# Patient Record
Sex: MALE | Race: BLACK OR AFRICAN AMERICAN
[De-identification: names, ages, dates, MRNs, and addresses within clinical notes are randomized per-mention and may not be internally consistent; named-entity substitution may affect disease eponyms.]

---

## 2019-12-09 ENCOUNTER — HOSPITAL ENCOUNTER (INPATIENT)
Dept: HOSPITAL 61 - ER | Age: 84
LOS: 4 days | Discharge: NURSING, REHAB OR LONG-TERM CARE | DRG: 683 | End: 2019-12-13
Attending: INTERNAL MEDICINE | Admitting: INTERNAL MEDICINE
Payer: MEDICARE

## 2019-12-09 VITALS — HEIGHT: 67 IN | WEIGHT: 170 LBS | BODY MASS INDEX: 26.68 KG/M2

## 2019-12-09 DIAGNOSIS — G30.9: ICD-10-CM

## 2019-12-09 DIAGNOSIS — I50.30: ICD-10-CM

## 2019-12-09 DIAGNOSIS — K21.9: ICD-10-CM

## 2019-12-09 DIAGNOSIS — F02.80: ICD-10-CM

## 2019-12-09 DIAGNOSIS — E78.5: ICD-10-CM

## 2019-12-09 DIAGNOSIS — E78.00: ICD-10-CM

## 2019-12-09 DIAGNOSIS — Z82.49: ICD-10-CM

## 2019-12-09 DIAGNOSIS — Z87.11: ICD-10-CM

## 2019-12-09 DIAGNOSIS — E86.0: ICD-10-CM

## 2019-12-09 DIAGNOSIS — Z82.3: ICD-10-CM

## 2019-12-09 DIAGNOSIS — N40.0: ICD-10-CM

## 2019-12-09 DIAGNOSIS — N17.9: Primary | ICD-10-CM

## 2019-12-09 DIAGNOSIS — I13.0: ICD-10-CM

## 2019-12-09 DIAGNOSIS — N18.3: ICD-10-CM

## 2019-12-09 DIAGNOSIS — M19.90: ICD-10-CM

## 2019-12-09 LAB
ALBUMIN SERPL-MCNC: 3.8 G/DL (ref 3.4–5)
ALBUMIN/GLOB SERPL: 1 {RATIO} (ref 1–1.7)
ALP SERPL-CCNC: 88 U/L (ref 46–116)
ALT SERPL-CCNC: 17 U/L (ref 16–63)
ANION GAP SERPL CALC-SCNC: 8 MMOL/L (ref 6–14)
APTT PPP: YELLOW S
AST SERPL-CCNC: 20 U/L (ref 15–37)
BACTERIA #/AREA URNS HPF: 0 /HPF
BASOPHILS # BLD AUTO: 0 X10^3/UL (ref 0–0.2)
BASOPHILS NFR BLD: 1 % (ref 0–3)
BILIRUB SERPL-MCNC: 0.2 MG/DL (ref 0.2–1)
BILIRUB UR QL STRIP: NEGATIVE
BUN SERPL-MCNC: 32 MG/DL (ref 8–26)
BUN/CREAT SERPL: 15 (ref 6–20)
CALCIUM SERPL-MCNC: 8.7 MG/DL (ref 8.5–10.1)
CHLORIDE SERPL-SCNC: 106 MMOL/L (ref 98–107)
CO2 SERPL-SCNC: 29 MMOL/L (ref 21–32)
CREAT SERPL-MCNC: 2.2 MG/DL (ref 0.7–1.3)
EOSINOPHIL NFR BLD: 0.2 X10^3/UL (ref 0–0.7)
EOSINOPHIL NFR BLD: 3 % (ref 0–3)
ERYTHROCYTE [DISTWIDTH] IN BLOOD BY AUTOMATED COUNT: 14.3 % (ref 11.5–14.5)
FIBRINOGEN PPP-MCNC: CLEAR MG/DL
GFR SERPLBLD BASED ON 1.73 SQ M-ARVRAT: 34.4 ML/MIN
GLOBULIN SER-MCNC: 4 G/DL (ref 2.2–3.8)
GLUCOSE SERPL-MCNC: 111 MG/DL (ref 70–99)
HCT VFR BLD CALC: 36.7 % (ref 39–53)
HGB BLD-MCNC: 12.1 G/DL (ref 13–17.5)
LYMPHOCYTES # BLD: 2.1 X10^3/UL (ref 1–4.8)
LYMPHOCYTES NFR BLD AUTO: 32 % (ref 24–48)
MCH RBC QN AUTO: 28 PG (ref 25–35)
MCHC RBC AUTO-ENTMCNC: 33 G/DL (ref 31–37)
MCV RBC AUTO: 86 FL (ref 79–100)
MONO #: 0.9 X10^3/UL (ref 0–1.1)
MONOCYTES NFR BLD: 13 % (ref 0–9)
NEUT #: 3.5 X10^3/UL (ref 1.8–7.7)
NEUTROPHILS NFR BLD AUTO: 52 % (ref 31–73)
NITRITE UR QL STRIP: NEGATIVE
PH UR STRIP: 6.5 [PH]
PLATELET # BLD AUTO: 137 X10^3/UL (ref 140–400)
POTASSIUM SERPL-SCNC: 4.6 MMOL/L (ref 3.5–5.1)
PROT SERPL-MCNC: 7.8 G/DL (ref 6.4–8.2)
PROT UR STRIP-MCNC: NEGATIVE MG/DL
RBC # BLD AUTO: 4.25 X10^6/UL (ref 4.3–5.7)
RBC #/AREA URNS HPF: (no result) /HPF (ref 0–2)
SODIUM SERPL-SCNC: 143 MMOL/L (ref 136–145)
SQUAMOUS #/AREA URNS LPF: (no result) /LPF
UROBILINOGEN UR-MCNC: 0.2 MG/DL
WBC # BLD AUTO: 6.7 X10^3/UL (ref 4–11)
WBC #/AREA URNS HPF: (no result) /HPF (ref 0–4)

## 2019-12-09 PROCEDURE — 80048 BASIC METABOLIC PNL TOTAL CA: CPT

## 2019-12-09 PROCEDURE — 80069 RENAL FUNCTION PANEL: CPT

## 2019-12-09 PROCEDURE — 36415 COLL VENOUS BLD VENIPUNCTURE: CPT

## 2019-12-09 PROCEDURE — 93005 ELECTROCARDIOGRAM TRACING: CPT

## 2019-12-09 PROCEDURE — 71045 X-RAY EXAM CHEST 1 VIEW: CPT

## 2019-12-09 PROCEDURE — 94760 N-INVAS EAR/PLS OXIMETRY 1: CPT

## 2019-12-09 PROCEDURE — 73562 X-RAY EXAM OF KNEE 3: CPT

## 2019-12-09 PROCEDURE — 84484 ASSAY OF TROPONIN QUANT: CPT

## 2019-12-09 PROCEDURE — G0378 HOSPITAL OBSERVATION PER HR: HCPCS

## 2019-12-09 PROCEDURE — 80053 COMPREHEN METABOLIC PANEL: CPT

## 2019-12-09 PROCEDURE — 93306 TTE W/DOPPLER COMPLETE: CPT

## 2019-12-09 PROCEDURE — 80061 LIPID PANEL: CPT

## 2019-12-09 PROCEDURE — 85025 COMPLETE CBC W/AUTO DIFF WBC: CPT

## 2019-12-09 PROCEDURE — 81001 URINALYSIS AUTO W/SCOPE: CPT

## 2019-12-09 PROCEDURE — 83880 ASSAY OF NATRIURETIC PEPTIDE: CPT

## 2019-12-09 NOTE — PHYS DOC
Past Medical History


Past Medical History:  Dementia, High Cholesterol, Hypertension, Sinusitis, 

Other


Additional Past Medical Histor:  BPH, ENLARGED PROSTATE


Past Surgical History:  No Surgical History


Alcohol Use:  None


Drug Use:  None





Adult General


Chief Complaint


Chief Complaint:  MULTIPLE COMPLAINTS





[facility]





80-year-old male presents to the emergency department with complaints of 

shortness of breath, bilateral knee pain. Patient has underlying history of 

dementia, hypertension, hyperlipidemia. Patient is present with his family. Upon

further review it sounds like the shortness of breath has been worsening over 

several days worse with exertion today, family states he can't walk to the 

bathroom without having shortness of breath and having to stop. He describes 

bilateral knee pain is his primary concern he has not filled these winded. He 

denies any fall or injury. He did not use a walker. He denies cough.





Review of Systems


Review of Systems





Constitutional: Denies fever or chills []


Eyes: Denies change in visual acuity, redness, or eye pain []


HENT: Denies nasal congestion or sore throat []


Respiratory: + SOB


Cardiovascular: No additional information not addressed in HPI []


GI: Denies abdominal pain, nausea, vomiting, bloody stools or diarrhea []


Musculoskeletal: bilateral knee pain


Neurologic: Denies headache, focal weakness or sensory changes []





All other systems were reviewed and found to be within normal limits, except as 

documented in this note.





Allergies


Allergies





Allergies








Coded Allergies Type Severity Reaction Last Updated Verified


 


  No Known Drug Allergies    11/7/14 No











Physical Exam


Physical Exam





Constitutional: Well developed, well nourished, no acute distress, non-toxic 

appearance. []


HENT: Normocephalic, atraumatic, bilateral external ears normal, oropharynx 

moist, no oral exudates, nose normal. []


Eyes: PERRLA, EOMI, conjunctiva normal, no discharge. [] 


Neck: Normal range of motion, no tenderness, supple, no stridor. [] 


Cardiovascular:Heart rate regular rhythm, no murmur []


Lungs & Thorax:  decreased BS bilaterally


Abdomen: Bowel sounds normal, soft, no tenderness, no masses, no pulsatile 

masses. [] 


Skin: Warm, dry, no erythema, no rash. [] 


Back: No tenderness, no CVA tenderness. [] 


Extremities: No tenderness, no edema. [] 


Neurologic: Alert and oriented X 3, no focal deficits noted. []


Psychologic: Affect normal, judgement normal, mood normal. []





Current Patient Data


Vital Signs





                                   Vital Signs








  Date Time  Temp Pulse Resp B/P (MAP) Pulse Ox O2 Delivery O2 Flow Rate FiO2


 


12/9/19 20:02 98.0 97 14 166/84 (111) 97 Room Air  





 98.0       








Lab Values





                                Laboratory Tests








Test


 12/9/19


20:10 12/9/19


20:51 12/9/19


20:54


 


White Blood Count


 6.7 x10^3/uL


(4.0-11.0) 


 





 


Red Blood Count


 4.25 x10^6/uL


(4.30-5.70)  L 


 





 


Hemoglobin


 12.1 g/dL


(13.0-17.5)  L 


 





 


Hematocrit


 36.7 %


(39.0-53.0)  L 


 





 


Mean Corpuscular Volume


 86 fL ()


 


 





 


Mean Corpuscular Hemoglobin 28 pg (25-35)    


 


Mean Corpuscular Hemoglobin


Concent 33 g/dL


(31-37) 


 





 


Red Cell Distribution Width


 14.3 %


(11.5-14.5) 


 





 


Platelet Count


 137 x10^3/uL


(140-400)  L 


 





 


Neutrophils (%) (Auto) 52 % (31-73)    


 


Lymphocytes (%) (Auto) 32 % (24-48)    


 


Monocytes (%) (Auto) 13 % (0-9)  H  


 


Eosinophils (%) (Auto) 3 % (0-3)    


 


Basophils (%) (Auto) 1 % (0-3)    


 


Neutrophils # (Auto)


 3.5 x10^3/uL


(1.8-7.7) 


 





 


Lymphocytes # (Auto)


 2.1 x10^3/uL


(1.0-4.8) 


 





 


Monocytes # (Auto)


 0.9 x10^3/uL


(0.0-1.1) 


 





 


Eosinophils # (Auto)


 0.2 x10^3/uL


(0.0-0.7) 


 





 


Basophils # (Auto)


 0.0 x10^3/uL


(0.0-0.2) 


 





 


Urine Collection Type  Unknown   


 


Urine Color  Yellow   


 


Urine Clarity  Clear   


 


Urine pH  6.5   


 


Urine Specific Gravity  1.020   


 


Urine Protein


 


 Negative mg/dL


(NEG-TRACE) 





 


Urine Glucose (UA)


 


 Negative mg/dL


(NEG) 





 


Urine Ketones (Stick)


 


 Negative mg/dL


(NEG) 





 


Urine Blood


 


 Negative (NEG)


 





 


Urine Nitrite


 


 Negative (NEG)


 





 


Urine Bilirubin


 


 Negative (NEG)


 





 


Urine Urobilinogen Dipstick


 


 0.2 mg/dL (0.2


mg/dL) 





 


Urine Leukocyte Esterase


 


 Negative (NEG)


 





 


Urine RBC


 


 Occ /HPF (0-2)


 





 


Urine WBC


 


 Rare /HPF


(0-4) 





 


Urine Squamous Epithelial


Cells 


 Few /LPF  


 





 


Urine Bacteria


 


 0 /HPF (0-FEW)


 





 


Urine Mucus  Slight /LPF   


 


Sodium Level


 


 


 143 mmol/L


(136-145)


 


Potassium Level


 


 


 4.6 mmol/L


(3.5-5.1)


 


Chloride Level


 


 


 106 mmol/L


()


 


Carbon Dioxide Level


 


 


 29 mmol/L


(21-32)


 


Anion Gap   8 (6-14)  


 


Blood Urea Nitrogen


 


 


 32 mg/dL


(8-26)  H


 


Creatinine


 


 


 2.2 mg/dL


(0.7-1.3)  H


 


Estimated GFR


(Cockcroft-Gault) 


 


 34.4  





 


BUN/Creatinine Ratio   15 (6-20)  


 


Glucose Level


 


 


 111 mg/dL


(70-99)  H


 


Calcium Level


 


 


 8.7 mg/dL


(8.5-10.1)


 


Total Bilirubin


 


 


 0.2 mg/dL


(0.2-1.0)


 


Aspartate Amino Transferase


(AST) 


 


 20 U/L (15-37)





 


Alanine Aminotransferase (ALT)


 


 


 17 U/L (16-63)





 


Alkaline Phosphatase


 


 


 88 U/L


()


 


Troponin I Quantitative


 


 


 < 0.017 ng/mL


(0.000-0.055)


 


NT-Pro-B-Type Natriuretic


Peptide 


 


 162 pg/mL


(0-449)


 


Total Protein


 


 


 7.8 g/dL


(6.4-8.2)


 


Albumin


 


 


 3.8 g/dL


(3.4-5.0)


 


Albumin/Globulin Ratio   1.0 (1.0-1.7)  





                                Laboratory Tests


12/9/19 20:10








                                Laboratory Tests


12/9/19 20:54











EKG


EKG


[]





Radiology/Procedures


Radiology/Procedures


[]





Course & Med Decision Making


Course & Med Decision Making


Pertinent Labs and Imaging studies reviewed. (See chart for details)





[]80-year-old male presents to the emergency department with complaints of 

shortness of breath, bilateral knee pain. Patient has underlying history of 

dementia, hypertension, hyperlipidemia. Patient is present with his family. Upon

 further review it sounds like the shortness of breath has been worsening over 

several days worse with exertion today, family states he can't walk to the 

bathroom without having shortness of breath and having to stop. He describes 

bilateral knee pain is his primary concern he has not filled these winded. He 

denies any fall or injury. He did not use a walker. He denies cough.





Laboratory values reviewed, creatinine 2.2, BUN elevated 32. Urinalysis reveals 

no evidence of urinary tract infection, BNP is 162, troponin was less than 0.

017. White blood cell count within normal limits at 6.7. Chest x-ray shows no 

evidence of acute consolidation. Given a K I will admit patient with IV fluids 

overnight and plan for further evaluation with laboratory studies in the a.m.





Dragon Disclaimer


Dragon Disclaimer


This electronic medical record was generated, in whole or in part, using a voice

 recognition dictation system.





Departure


Departure


Impression:  


   Primary Impression:  


   SABRA (acute kidney injury)


   Additional Impression:  


   Dehydration


Disposition:  09 ADMITTED AS INPATIENT


Admitting Physician:  HIMS


Condition:  STABLE


Referrals:  


UNKNOWN PCP NAME (PCP)





Problem Qualifiers











MYCHAL SADLER MD               Dec 9, 2019 20:19

## 2019-12-10 VITALS — DIASTOLIC BLOOD PRESSURE: 84 MMHG | SYSTOLIC BLOOD PRESSURE: 157 MMHG

## 2019-12-10 VITALS — DIASTOLIC BLOOD PRESSURE: 91 MMHG | SYSTOLIC BLOOD PRESSURE: 144 MMHG

## 2019-12-10 VITALS — DIASTOLIC BLOOD PRESSURE: 88 MMHG | SYSTOLIC BLOOD PRESSURE: 171 MMHG

## 2019-12-10 VITALS — DIASTOLIC BLOOD PRESSURE: 76 MMHG | SYSTOLIC BLOOD PRESSURE: 142 MMHG

## 2019-12-10 VITALS — SYSTOLIC BLOOD PRESSURE: 160 MMHG | DIASTOLIC BLOOD PRESSURE: 93 MMHG

## 2019-12-10 VITALS — DIASTOLIC BLOOD PRESSURE: 89 MMHG | SYSTOLIC BLOOD PRESSURE: 151 MMHG

## 2019-12-10 LAB
ALBUMIN SERPL-MCNC: 3.4 G/DL (ref 3.4–5)
ALBUMIN/GLOB SERPL: 0.8 {RATIO} (ref 1–1.7)
ALP SERPL-CCNC: 68 U/L (ref 46–116)
ALT SERPL-CCNC: 16 U/L (ref 16–63)
ANION GAP SERPL CALC-SCNC: 9 MMOL/L (ref 6–14)
AST SERPL-CCNC: 19 U/L (ref 15–37)
BASOPHILS # BLD AUTO: 0.1 X10^3/UL (ref 0–0.2)
BASOPHILS NFR BLD: 1 % (ref 0–3)
BILIRUB SERPL-MCNC: 0.2 MG/DL (ref 0.2–1)
BUN SERPL-MCNC: 29 MG/DL (ref 8–26)
BUN/CREAT SERPL: 15 (ref 6–20)
CALCIUM SERPL-MCNC: 8.6 MG/DL (ref 8.5–10.1)
CHLORIDE SERPL-SCNC: 107 MMOL/L (ref 98–107)
CO2 SERPL-SCNC: 27 MMOL/L (ref 21–32)
CREAT SERPL-MCNC: 2 MG/DL (ref 0.7–1.3)
EOSINOPHIL NFR BLD: 0.2 X10^3/UL (ref 0–0.7)
EOSINOPHIL NFR BLD: 3 % (ref 0–3)
ERYTHROCYTE [DISTWIDTH] IN BLOOD BY AUTOMATED COUNT: 13.9 % (ref 11.5–14.5)
GFR SERPLBLD BASED ON 1.73 SQ M-ARVRAT: 38.3 ML/MIN
GLOBULIN SER-MCNC: 4.1 G/DL (ref 2.2–3.8)
GLUCOSE SERPL-MCNC: 100 MG/DL (ref 70–99)
HCT VFR BLD CALC: 36.2 % (ref 39–53)
HGB BLD-MCNC: 11.6 G/DL (ref 13–17.5)
LYMPHOCYTES # BLD: 2 X10^3/UL (ref 1–4.8)
LYMPHOCYTES NFR BLD AUTO: 31 % (ref 24–48)
MCH RBC QN AUTO: 28 PG (ref 25–35)
MCHC RBC AUTO-ENTMCNC: 32 G/DL (ref 31–37)
MCV RBC AUTO: 87 FL (ref 79–100)
MONO #: 0.9 X10^3/UL (ref 0–1.1)
MONOCYTES NFR BLD: 13 % (ref 0–9)
NEUT #: 3.3 X10^3/UL (ref 1.8–7.7)
NEUTROPHILS NFR BLD AUTO: 52 % (ref 31–73)
PLATELET # BLD AUTO: 127 X10^3/UL (ref 140–400)
POTASSIUM SERPL-SCNC: 4.2 MMOL/L (ref 3.5–5.1)
PROT SERPL-MCNC: 7.5 G/DL (ref 6.4–8.2)
RBC # BLD AUTO: 4.17 X10^6/UL (ref 4.3–5.7)
SODIUM SERPL-SCNC: 143 MMOL/L (ref 136–145)
WBC # BLD AUTO: 6.5 X10^3/UL (ref 4–11)

## 2019-12-10 RX ADMIN — DONEPEZIL HYDROCHLORIDE SCH MG: 10 TABLET, FILM COATED ORAL at 16:30

## 2019-12-10 RX ADMIN — TAMSULOSIN HYDROCHLORIDE SCH MG: 0.4 CAPSULE ORAL at 21:19

## 2019-12-10 RX ADMIN — TIMOLOL MALEATE SCH DROP: 5 SOLUTION/ DROPS OPHTHALMIC at 21:20

## 2019-12-10 RX ADMIN — MEMANTINE HYDROCHLORIDE SCH MG: 10 TABLET ORAL at 16:30

## 2019-12-10 RX ADMIN — LISINOPRIL SCH MG: 10 TABLET ORAL at 16:30

## 2019-12-10 RX ADMIN — SIMVASTATIN SCH MG: 40 TABLET, FILM COATED ORAL at 21:19

## 2019-12-10 NOTE — PDOC1
History and Physical


Date of Admission


Date of Admission


DATE: 12/10/19 


TIME: 09:40





Identification/Chief Complaint


Chief Complaint


 SEEN IN ER , 80-year-old male presents to the emergency department with 

complaints of shortness of breath, bilateral knee pain. Patient has underlying 

history of dementia, hypertension, hyperlipidemia. Patient is present with his 

family. Upon further review it sounds like the shortness of breath has been 

worsening over several days worse with exertion 








family states he can't walk to the bathroom without having shortness of breath 

and having to stop. 





He describes bilateral knee pain is his primary concern, unable to walk , PT/OT 

ORDERED





Past Medical History


Past Medical History:  Dementia, High Cholesterol, Hypertension, Sinusitis, 

Other


Additional Past Medical Histor:  BPH, ENLARGED PROSTATE


Past Surgical History:  No Surgical History


Alcohol Use:  None


Drug Use:  None








fhx htn


Cardiovascular:  HTN, Hyperlipidemia


Pulmonary:  No pertinent hx


CENTRAL NERVOUS SYSTEM:  Dementia


GI:  GERD, Peptic Ulcer disease


Heme/Onc:  No pertinent hx


Hepatobiliary:  No pertinent hx


Psych:  No pertinent hx


Musculoskeletal:  Osteoarthritis


Rheumatologic:  No pertinent hx


Infectious disease:  No pertinent hx


Renal/:  No pertinent hx


Endocrine:  No pertinent hx





Past Surgical History


Past Surgical History:  Cataract Removal





Family History


Family History:  Hypertension, Stroke





Social History


Smoke:  No


ALCOHOL:  none


Drugs:  None





Current Problem List


Problem List


Problems


Medical Problems:


(1) SABRA (acute kidney injury)


Status: Acute  





(2) Dehydration


Status: Acute  











Current Medications


Current Medications





Current Medications


Ondansetron HCl (Zofran) 4 mg PRN Q8HRS  PRN IV NAUSEA/VOMITING 1ST CHOICE;  

Start 12/9/19 at 22:30;  Stop 12/10/19 at 22:29


Acetaminophen (Tylenol) 650 mg PRN Q4HRS  PRN PO FEVER;  Start 12/9/19 at 22:30;

 Stop 12/10/19 at 22:29


Sodium Chloride 1,000 ml @  75 mls/hr 1X  ONCE IV  Last administered on 

12/9/19at 23:36;  Start 12/9/19 at 23:00;  Stop 12/10/19 at 12:19





Active Scripts


Active


Reported


Tamsulosin Hcl 0.4 Mg Cap.er.24h    


Namenda (Memantine Hcl) 10 Mg Tablet    


Timolol Maleate 10 Ml Drops    


Lisinopril 20 Mg Tablet    


Donepezil Hcl 10 Mg Tablet    


Simvastatin 40 Mg Tablet





Allergies


Allergies:  


Coded Allergies:  


     No Known Drug Allergies (Unverified , 11/7/14)





ROS


Review of System





Review of Systems


Review of Systems





Constitutional: Denies fever or chills []


Eyes: Denies change in visual acuity, redness, or eye pain []


HENT: Denies nasal congestion or sore throat []


Respiratory: + SOB


Cardiovascular: No additional information not addressed in HPI []


GI: Denies abdominal pain, nausea, vomiting, bloody stools or diarrhea []


Musculoskeletal: bilateral knee pain


Neurologic: Denies headache, focal weakness or sensory changes []





14 PT  systems were reviewed and found to be within normal limits, except as doc

umented .


General:  YES: Fatigue


PSYCHOLOGICAL ROS:  YES: Anxiety, Memory difficulties


Hematological and Lymphatic:  No: Bleeding Problems, Blood Clots, Blood 

Transfusions, Brusing, Night Sweats, Pallor, Swollen Lymph Nodes, Other


Cardiovascular:  No Chest Pain, No Palpitations, No Orthopnea, No Paroxysmal 

Noc. Dyspnea, No Edema, No Lt Headedness, No Other


Gastrointestinal:  No Nausea, No Vomiting, No Abdominal Pain, No Diarrhea, No 

Constipation, No Melena, No Hematochezia, No Other


Musculoskeletal:  Yes Gait Disturbance, Yes Joint Pain, Yes Joint Stiffness


Neurological:  Yes Gait Disturbance





Physical Exam


Physical Exam





Physical Exam


Physical Exam





Constitutional: Well developed, well nourished, no acute distress, non-toxic 

appearance. []


HENT: Normocephalic, atraumatic, bilateral external ears normal, oropharynx 

moist, no oral exudates, nose normal. []


Eyes: PERRLA, EOMI, conjunctiva normal, no discharge. [] 


Neck: Normal range of motion, no tenderness, supple, no stridor. [] 


Cardiovascular:Heart rate regular rhythm, no murmur []


Lungs & Thorax:  decreased BS bilaterally


Abdomen: Bowel sounds normal, soft, no tenderness, no masses, no pulsatile 

masses. [] 


Skin: Warm, dry, no erythema, no rash. [] 


Back: No tenderness, no CVA tenderness. [] 


Extremities: No tenderness, no edema. [] 


Neurologic: Alert and oriented X 3, no focal deficits noted. []


Psychologic: Affect normal, judgment normal, mood normal. []


General:  Alert, Cooperative, No acute distress


Breasts:  Not examined


Abdomen:  Normal bowel sounds, Soft


Rectal Exam:  not examined


PELVIC:  Examination not indicated


Extremities:  No cyanosis


Neuro:  Normal speech, Cranial nerves 3-12 NL


Psych/Mental Status:  Mood NL





Vitals


Vitals





Vital Signs








  Date Time  Temp Pulse Resp B/P (MAP) Pulse Ox O2 Delivery O2 Flow Rate FiO2


 


12/10/19 07:00 97.7 80 18 171/88 (115) 98 Room Air  





 97.7       











Labs


Labs





Laboratory Tests








Test


 12/9/19


20:10 12/9/19


20:51 12/9/19


20:54 12/10/19


04:23


 


White Blood Count


 6.7 x10^3/uL


(4.0-11.0) 


 


 6.5 x10^3/uL


(4.0-11.0)


 


Red Blood Count


 4.25 x10^6/uL


(4.30-5.70) 


 


 4.17 x10^6/uL


(4.30-5.70)


 


Hemoglobin


 12.1 g/dL


(13.0-17.5) 


 


 11.6 g/dL


(13.0-17.5)


 


Hematocrit


 36.7 %


(39.0-53.0) 


 


 36.2 %


(39.0-53.0)


 


Mean Corpuscular Volume 86 fL ()    87 fL () 


 


Mean Corpuscular Hemoglobin 28 pg (25-35)    28 pg (25-35) 


 


Mean Corpuscular Hemoglobin


Concent 33 g/dL


(31-37) 


 


 32 g/dL


(31-37)


 


Red Cell Distribution Width


 14.3 %


(11.5-14.5) 


 


 13.9 %


(11.5-14.5)


 


Platelet Count


 137 x10^3/uL


(140-400) 


 


 127 x10^3/uL


(140-400)


 


Neutrophils (%) (Auto) 52 % (31-73)    52 % (31-73) 


 


Lymphocytes (%) (Auto) 32 % (24-48)    31 % (24-48) 


 


Monocytes (%) (Auto) 13 % (0-9)    13 % (0-9) 


 


Eosinophils (%) (Auto) 3 % (0-3)    3 % (0-3) 


 


Basophils (%) (Auto) 1 % (0-3)    1 % (0-3) 


 


Neutrophils # (Auto)


 3.5 x10^3/uL


(1.8-7.7) 


 


 3.3 x10^3/uL


(1.8-7.7)


 


Lymphocytes # (Auto)


 2.1 x10^3/uL


(1.0-4.8) 


 


 2.0 x10^3/uL


(1.0-4.8)


 


Monocytes # (Auto)


 0.9 x10^3/uL


(0.0-1.1) 


 


 0.9 x10^3/uL


(0.0-1.1)


 


Eosinophils # (Auto)


 0.2 x10^3/uL


(0.0-0.7) 


 


 0.2 x10^3/uL


(0.0-0.7)


 


Basophils # (Auto)


 0.0 x10^3/uL


(0.0-0.2) 


 


 0.1 x10^3/uL


(0.0-0.2)


 


Urine Collection Type  Unknown   


 


Urine Color  Yellow   


 


Urine Clarity  Clear   


 


Urine pH  6.5   


 


Urine Specific Gravity  1.020   


 


Urine Protein


 


 Negative mg/dL


(NEG-TRACE) 


 





 


Urine Glucose (UA)


 


 Negative mg/dL


(NEG) 


 





 


Urine Ketones (Stick)


 


 Negative mg/dL


(NEG) 


 





 


Urine Blood  Negative (NEG)   


 


Urine Nitrite  Negative (NEG)   


 


Urine Bilirubin  Negative (NEG)   


 


Urine Urobilinogen Dipstick


 


 0.2 mg/dL (0.2


mg/dL) 


 





 


Urine Leukocyte Esterase  Negative (NEG)   


 


Urine RBC  Occ /HPF (0-2)   


 


Urine WBC


 


 Rare /HPF


(0-4) 


 





 


Urine Squamous Epithelial


Cells 


 Few /LPF 


 


 





 


Urine Bacteria  0 /HPF (0-FEW)   


 


Urine Mucus  Slight /LPF   


 


Sodium Level


 


 


 143 mmol/L


(136-145) 143 mmol/L


(136-145)


 


Potassium Level


 


 


 4.6 mmol/L


(3.5-5.1) 4.2 mmol/L


(3.5-5.1)


 


Chloride Level


 


 


 106 mmol/L


() 107 mmol/L


()


 


Carbon Dioxide Level


 


 


 29 mmol/L


(21-32) 27 mmol/L


(21-32)


 


Anion Gap   8 (6-14)  9 (6-14) 


 


Blood Urea Nitrogen


 


 


 32 mg/dL


(8-26) 29 mg/dL


(8-26)


 


Creatinine


 


 


 2.2 mg/dL


(0.7-1.3) 2.0 mg/dL


(0.7-1.3)


 


Estimated GFR


(Cockcroft-Gault) 


 


 34.4 


 38.3 





 


BUN/Creatinine Ratio   15 (6-20)  15 (6-20) 


 


Glucose Level


 


 


 111 mg/dL


(70-99) 100 mg/dL


(70-99)


 


Calcium Level


 


 


 8.7 mg/dL


(8.5-10.1) 8.6 mg/dL


(8.5-10.1)


 


Total Bilirubin


 


 


 0.2 mg/dL


(0.2-1.0) 0.2 mg/dL


(0.2-1.0)


 


Aspartate Amino Transf


(AST/SGOT) 


 


 20 U/L (15-37) 


 19 U/L (15-37) 





 


Alanine Aminotransferase


(ALT/SGPT) 


 


 17 U/L (16-63) 


 16 U/L (16-63) 





 


Alkaline Phosphatase


 


 


 88 U/L


() 68 U/L


()


 


Troponin I Quantitative


 


 


 < 0.017 ng/mL


(0.000-0.055) 





 


NT-Pro-B-Type Natriuretic


Peptide 


 


 162 pg/mL


(0-449) 





 


Total Protein


 


 


 7.8 g/dL


(6.4-8.2) 7.5 g/dL


(6.4-8.2)


 


Albumin


 


 


 3.8 g/dL


(3.4-5.0) 3.4 g/dL


(3.4-5.0)


 


Albumin/Globulin Ratio   1.0 (1.0-1.7)  0.8 (1.0-1.7) 








Laboratory Tests








Test


 12/9/19


20:10 12/9/19


20:51 12/9/19


20:54 12/10/19


04:23


 


White Blood Count


 6.7 x10^3/uL


(4.0-11.0) 


 


 6.5 x10^3/uL


(4.0-11.0)


 


Red Blood Count


 4.25 x10^6/uL


(4.30-5.70) 


 


 4.17 x10^6/uL


(4.30-5.70)


 


Hemoglobin


 12.1 g/dL


(13.0-17.5) 


 


 11.6 g/dL


(13.0-17.5)


 


Hematocrit


 36.7 %


(39.0-53.0) 


 


 36.2 %


(39.0-53.0)


 


Mean Corpuscular Volume 86 fL ()    87 fL () 


 


Mean Corpuscular Hemoglobin 28 pg (25-35)    28 pg (25-35) 


 


Mean Corpuscular Hemoglobin


Concent 33 g/dL


(31-37) 


 


 32 g/dL


(31-37)


 


Red Cell Distribution Width


 14.3 %


(11.5-14.5) 


 


 13.9 %


(11.5-14.5)


 


Platelet Count


 137 x10^3/uL


(140-400) 


 


 127 x10^3/uL


(140-400)


 


Neutrophils (%) (Auto) 52 % (31-73)    52 % (31-73) 


 


Lymphocytes (%) (Auto) 32 % (24-48)    31 % (24-48) 


 


Monocytes (%) (Auto) 13 % (0-9)    13 % (0-9) 


 


Eosinophils (%) (Auto) 3 % (0-3)    3 % (0-3) 


 


Basophils (%) (Auto) 1 % (0-3)    1 % (0-3) 


 


Neutrophils # (Auto)


 3.5 x10^3/uL


(1.8-7.7) 


 


 3.3 x10^3/uL


(1.8-7.7)


 


Lymphocytes # (Auto)


 2.1 x10^3/uL


(1.0-4.8) 


 


 2.0 x10^3/uL


(1.0-4.8)


 


Monocytes # (Auto)


 0.9 x10^3/uL


(0.0-1.1) 


 


 0.9 x10^3/uL


(0.0-1.1)


 


Eosinophils # (Auto)


 0.2 x10^3/uL


(0.0-0.7) 


 


 0.2 x10^3/uL


(0.0-0.7)


 


Basophils # (Auto)


 0.0 x10^3/uL


(0.0-0.2) 


 


 0.1 x10^3/uL


(0.0-0.2)


 


Urine Collection Type  Unknown   


 


Urine Color  Yellow   


 


Urine Clarity  Clear   


 


Urine pH  6.5   


 


Urine Specific Gravity  1.020   


 


Urine Protein


 


 Negative mg/dL


(NEG-TRACE) 


 





 


Urine Glucose (UA)


 


 Negative mg/dL


(NEG) 


 





 


Urine Ketones (Stick)


 


 Negative mg/dL


(NEG) 


 





 


Urine Blood  Negative (NEG)   


 


Urine Nitrite  Negative (NEG)   


 


Urine Bilirubin  Negative (NEG)   


 


Urine Urobilinogen Dipstick


 


 0.2 mg/dL (0.2


mg/dL) 


 





 


Urine Leukocyte Esterase  Negative (NEG)   


 


Urine RBC  Occ /HPF (0-2)   


 


Urine WBC


 


 Rare /HPF


(0-4) 


 





 


Urine Squamous Epithelial


Cells 


 Few /LPF 


 


 





 


Urine Bacteria  0 /HPF (0-FEW)   


 


Urine Mucus  Slight /LPF   


 


Sodium Level


 


 


 143 mmol/L


(136-145) 143 mmol/L


(136-145)


 


Potassium Level


 


 


 4.6 mmol/L


(3.5-5.1) 4.2 mmol/L


(3.5-5.1)


 


Chloride Level


 


 


 106 mmol/L


() 107 mmol/L


()


 


Carbon Dioxide Level


 


 


 29 mmol/L


(21-32) 27 mmol/L


(21-32)


 


Anion Gap   8 (6-14)  9 (6-14) 


 


Blood Urea Nitrogen


 


 


 32 mg/dL


(8-26) 29 mg/dL


(8-26)


 


Creatinine


 


 


 2.2 mg/dL


(0.7-1.3) 2.0 mg/dL


(0.7-1.3)


 


Estimated GFR


(Cockcroft-Gault) 


 


 34.4 


 38.3 





 


BUN/Creatinine Ratio   15 (6-20)  15 (6-20) 


 


Glucose Level


 


 


 111 mg/dL


(70-99) 100 mg/dL


(70-99)


 


Calcium Level


 


 


 8.7 mg/dL


(8.5-10.1) 8.6 mg/dL


(8.5-10.1)


 


Total Bilirubin


 


 


 0.2 mg/dL


(0.2-1.0) 0.2 mg/dL


(0.2-1.0)


 


Aspartate Amino Transf


(AST/SGOT) 


 


 20 U/L (15-37) 


 19 U/L (15-37) 





 


Alanine Aminotransferase


(ALT/SGPT) 


 


 17 U/L (16-63) 


 16 U/L (16-63) 





 


Alkaline Phosphatase


 


 


 88 U/L


() 68 U/L


()


 


Troponin I Quantitative


 


 


 < 0.017 ng/mL


(0.000-0.055) 





 


NT-Pro-B-Type Natriuretic


Peptide 


 


 162 pg/mL


(0-449) 





 


Total Protein


 


 


 7.8 g/dL


(6.4-8.2) 7.5 g/dL


(6.4-8.2)


 


Albumin


 


 


 3.8 g/dL


(3.4-5.0) 3.4 g/dL


(3.4-5.0)


 


Albumin/Globulin Ratio   1.0 (1.0-1.7)  0.8 (1.0-1.7) 











Images


Images





KNEE BILAT 3V


 


History: Knee pain, unknown if injury


 


Comparison: None.


 


Findings:


3 views of the bilateral knees vertebral 6 views are submitted. No acute 


fracture is identified. There is severe narrowing of the medial 


compartment joint space of the left knee, to lesser degree of the 


patellofemoral articulation. There is mild osteophyte formation of the 


lateral compartment of the left knee. There is vascular calcification. 


There is severe narrowing of the medial compartment joint space of the 


right knee, to lesser degree of the patellofemoral articulation, minimal 


osteoarthritic change of the left compartment of the right knee.


 


Impression: 


 


1.  There is tricompartmental osteoarthritic change of the bilateral 


knees, severe narrowing of the medial compartment joint spaces.


 


Electronically signed by: Edinson Michel MD (12/10/2019 12:21 AM) 


[facility]














DICTATED and SIGNED BY:     EDINSON MICHEL MD


DATE:     12/10/19 0021





Aortic Valve


AoV Peak Ben.   112.6cm/s   AoV VTI      24.6cm


AO Peak GR.   5.1mmHg      LVOT Peak Ben.   93.1cm/s


LVOT  VTI    20.51cm      AO Mean GR.   3mmHg


PEYTON (VMAX)   3.22cm2      PEYTON   (VTI)   3.25cm2


AI P 1/2 Time   459ms            





Mitral Valve


MV E Velocity   54.7cm/s   MV E Peak Gr.   3mmHg


MV DECEL TIME   196ms      MV A Velocity   85.5cm/s


MV E Mean Gr.   1mmHg      MV PHT      58ms


E/A  Ratio   0.6      MV A Duration   131ms


MVA (PHT)   3.81cm2            





Tricuspid Valve


TR P. Velocity   233cm/s   RAP ESTIMATE   5mmHg


TR Peak Gr.   22mmHg   RVSP      27mmHg





 LEFT VENTRICLE 


The left ventricle is normal size. There is borderline concentric left 

ventricular hypertrophy. Low normal LV systolic function. EF 50-55% Transmitral 

Doppler flow pattern is Grade I-abnormal relaxation pattern.





 RIGHT VENTRICLE 


The right ventricle is normal size. There is normal right ventricular wall 

thickness. The right ventricular systolic function is normal.





 ATRIA 


The left atrium size is normal. The right atrium size is normal. The interatrial

 septum is intact with no evidence for an atrial septal defect or patent foramen

 ovale as noted on 2-D or Doppler imaging.





 AORTIC VALVE 


The aortic valve is calcified but opens well. Doppler and Color Flow revealed 

trace aortic regurgitation. There is no significant aortic valvular stenosis.





 MITRAL VALVE 


The mitral valve is normal in structure. There is no evidence of mitral valve 

prolapse. There is no mitral valve stenosis. Doppler and Color Flow revealed 

trace to mild mitral regurgitation.





 TRICUSPID VALVE 


The tricuspid valve is normal in structure. Doppler and Color Flow revealed mild

 tricuspid regurgitation. The PA pressure was estimated at 27 mmHg. There is no 

tricuspid valve stenosis.





 PULMONIC VALVE 


The pulmonic valve is not well visualized. Doppler and Color Flow revealed 

moderate to severe pulmonic valvular regurgitation. There is no pulmonic 

valvular stenosis.





 GREAT VESSELS 


The aortic root is normal in size. Normal pulmonary venous flow (Doppler). The 

IVC is normal in size and collapses >50% with inspiration.





 PERICARDIAL EFFUSION 


There is no evidence of significant pericardial effusion.





Critical Notification


Critical Value: No





<Conclusion>


Low normal LV systolic function. EF 50-55%


No significant valvular abnormalities














DICTATED and SIGNED BY:     IRENE LAW MD


DATE:     07/24/15 1107





CC: ASHVIN MOSES; IRENE LAW MD; NO PCP; UNKNOWN PCP NAME ~





PORTABLE CHEST 1V


 


History: Shortness of breath


 


Comparison: July 23, 2015.


 


Findings:


Single view of the chest is submitted. 


There is no infiltrate, pneumothorax, or effusion. The pericardial cardiac


silhouette is within normal limits in size. There is somewhat tortuous 


thoracic aorta, atherosclerotic calcification near arch. 


 


Impression: 


 


1.  There is no significant infiltrate.


 


Electronically signed by: Edinson Michel MD (12/10/2019 12:11 AM) 


[facility]














DICTATED and SIGNED BY:     EDINSON MICHEL MD


DATE:     12/10/19 0011





VTE Prophylaxis Ordered


VTE Prophylaxis Devices:  No


VTE Pharmacological Prophylaxi:  No





Assessment/Plan


Assessment/Plan


 Impression:  





   SABRA (acute kidney injury)


   KNEE PAIN


   GAIT INSTABILITY


   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 

   narrowing of the medial compartment joint spaces.


   Dehydration











ADMITTED





NEPHROLOGY CONSULT


ORTHO CONSULT


BEDREST


FALL PRECAUTIONS


IV FLUID SUPPORT


NO NSAIDS


FOLLOW RENAL FX CLOSELY


pt/ot











RAMON WRIGHT MD          Dec 10, 2019 09:40

## 2019-12-10 NOTE — RAD
KNEE BILAT 3V

 

History: Knee pain, unknown if injury

 

Comparison: None.

 

Findings:

3 views of the bilateral knees vertebral 6 views are submitted. No acute 

fracture is identified. There is severe narrowing of the medial 

compartment joint space of the left knee, to lesser degree of the 

patellofemoral articulation. There is mild osteophyte formation of the 

lateral compartment of the left knee. There is vascular calcification. 

There is severe narrowing of the medial compartment joint space of the 

right knee, to lesser degree of the patellofemoral articulation, minimal 

osteoarthritic change of the left compartment of the right knee.

 

Impression: 

 

1.  There is tricompartmental osteoarthritic change of the bilateral 

knees, severe narrowing of the medial compartment joint spaces.

 

Electronically signed by: James Mock MD (12/10/2019 12:21 AM) 

[facility]

## 2019-12-10 NOTE — PDOC2
CONSULT


Date of Consult


Date of Consult


DATE: 12/10/19 


TIME: 18:31





Reason for Consult


Reason for Consult:


Bilateral knee pain





Identification/Chief Complaint


Chief Complaint


Difficult walking, bilateral knee pain





Source


Source:  Chart review, Patient





History of Present Illness


Reason for Visit:


This 88-year-old man lives with his family members including his son, 

daughter-in-law and others. He no longer drives. He's had difficulty walking. He

uses a walker routinely. His son gave some of the history and was in the room.





Past Medical History


Cardiovascular:  HTN, Hyperlipidemia


Pulmonary:  No pertinent hx


CENTRAL NERVOUS SYSTEM:  Dementia


GI:  GERD, Peptic Ulcer disease


Heme/Onc:  No pertinent hx


Hepatobiliary:  No pertinent hx


Psych:  No pertinent hx


Musculoskeletal:  Osteoarthritis


Rheumatologic:  No pertinent hx


Infectious disease:  No pertinent hx


Renal/:  No pertinent hx


Endocrine:  No pertinent hx





Past Surgical History


Past Surgical History:  Cataract Removal





Family History


Family History:  Hypertension, Stroke





Social History


No


ALCOHOL:  none


Drugs:  None


Lives:  with Family


Domestic Violence:  Neg





Current Problem List


Problem List


Problems


Medical Problems:


(1) SABRA (acute kidney injury)


Status: Acute  





(2) Dehydration


Status: Acute  











Current Medications


Current Medications





Current Medications


Ondansetron HCl (Zofran) 4 mg PRN Q8HRS  PRN IV NAUSEA/VOMITING 1ST CHOICE;  

Start 19 at 22:30;  Stop 12/10/19 at 22:29


Acetaminophen (Tylenol) 650 mg PRN Q4HRS  PRN PO FEVER;  Start 19 at 22:30;

 Stop 12/10/19 at 22:29


Sodium Chloride 1,000 ml @  75 mls/hr 1X  ONCE IV  Last administered on 

19at 23:36;  Start 19 at 23:00;  Stop 12/10/19 at 12:19;  Status DC


Donepezil HCl (Aricept) 10 mg DAILY08 PO ;  Start 12/10/19 at 16:30


Lisinopril (Prinivil) 10 mg DAILY08 PO ;  Start 12/10/19 at 16:30


Memantine (Namenda) 10 mg DAILY08 PO ;  Start 12/10/19 at 16:30


Simvastatin (Zocor) 40 mg HS PO ;  Start 12/10/19 at 21:00


Tamsulosin HCl (Flomax) 0.4 mg HS PO ;  Start 12/10/19 at 21:00


Timolol Maleate (Timoptic 0.5% Ophth) 1 drop HS OU ;  Start 12/10/19 at 21:00





Active Scripts


Active


Reported


Tamsulosin Hcl 0.4 Mg Cap.er.24h   


Namenda (Memantine Hcl) 10 Mg Tablet   


Timolol Maleate 10 Ml Drops   


Lisinopril 20 Mg Tablet   


Donepezil Hcl 10 Mg Tablet   


Simvastatin 40 Mg Tablet





Allergies


Allergies:  


Coded Allergies:  


     No Known Drug Allergies (Unverified , 14)





ROS


Review of System


14 PT  systems were reviewed and found to be within normal limits, except as 

documented .


General:  YES: Fatigue


PSYCHOLOGICAL ROS:  YES: Anxiety, Memory difficulties


Hematological and Lymphatic:  No: Bleeding Problems, Blood Clots, Blood 

Transfusions, Brusing, Night Sweats, Pallor, Swollen Lymph Nodes, Other


Cardiovascular:  No Chest Pain, No Palpitations, No Orthopnea, No Paroxysmal 

Noc. Dyspnea, No Edema, No Lt Headedness, No Other


Gastrointestinal:  No Nausea, No Vomiting, No Abdominal Pain, No Diarrhea, No 

Constipation, No Melena, No Hematochezia, No Other


Musculoskeletal:  Yes Gait Disturbance, Yes Joint Pain, Yes Joint Stiffness


Neurological:  Yes Gait Disturbance





Physical Exam


General:  Alert, Cooperative, No acute distress


HEENT:  Atraumatic


Lungs:  Normal air movement


Heart:  Regular rate


Abdomen:  Soft


Extremities:  Normal pulses, Other (He uses a walker and has a slow cautious 

gait. The RIGHT knee shows a severe varus alignment.  No masses.  No detectable 

effusion.  Tenderness on the joint lines.  Range of motion is  degrees.  

There is crepitus with range of motion, and pain at the extremes of motion.  The

knee is stable to varus and valgus stress without subluxation or laxity.  Muscle

strength is slightly weak for the quadriceps 4+/5 which may be due to pain or 

avoidance, and does not seem neurogenic, and the muscle tone and bulk is sli

ghtly decreased. The hamstring strength is 5/5.   The skin is normal with no 

scars, rashes, lesions or ulcers. Light touch sensation is intact. No edema and 

no varicosities.  Dorsalis pedis pulse is intact and capillary refill is 

normal.)


Skin:  No breakdown, No significant lesion


Neuro:  Normal speech, Sensation intact





Vitals


VITALS





Vital Signs








  Date Time  Temp Pulse Resp B/P (MAP) Pulse Ox O2 Delivery O2 Flow Rate FiO2


 


12/10/19 15:00 97.6 76 18 157/84 (108) 98 Room Air  





 97.6       











Labs


Labs





Laboratory Tests








Test


 19


20:10 19


20:51 19


20:54 12/10/19


04:23


 


White Blood Count


 6.7 x10^3/uL


(4.0-11.0) 


 


 6.5 x10^3/uL


(4.0-11.0)


 


Red Blood Count


 4.25 x10^6/uL


(4.30-5.70) 


 


 4.17 x10^6/uL


(4.30-5.70)


 


Hemoglobin


 12.1 g/dL


(13.0-17.5) 


 


 11.6 g/dL


(13.0-17.5)


 


Hematocrit


 36.7 %


(39.0-53.0) 


 


 36.2 %


(39.0-53.0)


 


Mean Corpuscular Volume 86 fL ()    87 fL () 


 


Mean Corpuscular Hemoglobin 28 pg (25-35)    28 pg (25-35) 


 


Mean Corpuscular Hemoglobin


Concent 33 g/dL


(31-37) 


 


 32 g/dL


(31-37)


 


Red Cell Distribution Width


 14.3 %


(11.5-14.5) 


 


 13.9 %


(11.5-14.5)


 


Platelet Count


 137 x10^3/uL


(140-400) 


 


 127 x10^3/uL


(140-400)


 


Neutrophils (%) (Auto) 52 % (31-73)    52 % (31-73) 


 


Lymphocytes (%) (Auto) 32 % (24-48)    31 % (24-48) 


 


Monocytes (%) (Auto) 13 % (0-9)    13 % (0-9) 


 


Eosinophils (%) (Auto) 3 % (0-3)    3 % (0-3) 


 


Basophils (%) (Auto) 1 % (0-3)    1 % (0-3) 


 


Neutrophils # (Auto)


 3.5 x10^3/uL


(1.8-7.7) 


 


 3.3 x10^3/uL


(1.8-7.7)


 


Lymphocytes # (Auto)


 2.1 x10^3/uL


(1.0-4.8) 


 


 2.0 x10^3/uL


(1.0-4.8)


 


Monocytes # (Auto)


 0.9 x10^3/uL


(0.0-1.1) 


 


 0.9 x10^3/uL


(0.0-1.1)


 


Eosinophils # (Auto)


 0.2 x10^3/uL


(0.0-0.7) 


 


 0.2 x10^3/uL


(0.0-0.7)


 


Basophils # (Auto)


 0.0 x10^3/uL


(0.0-0.2) 


 


 0.1 x10^3/uL


(0.0-0.2)


 


Urine Collection Type  Unknown   


 


Urine Color  Yellow   


 


Urine Clarity  Clear   


 


Urine pH  6.5   


 


Urine Specific Gravity  1.020   


 


Urine Protein


 


 Negative mg/dL


(NEG-TRACE) 


 





 


Urine Glucose (UA)


 


 Negative mg/dL


(NEG) 


 





 


Urine Ketones (Stick)


 


 Negative mg/dL


(NEG) 


 





 


Urine Blood  Negative (NEG)   


 


Urine Nitrite  Negative (NEG)   


 


Urine Bilirubin  Negative (NEG)   


 


Urine Urobilinogen Dipstick


 


 0.2 mg/dL (0.2


mg/dL) 


 





 


Urine Leukocyte Esterase  Negative (NEG)   


 


Urine RBC  Occ /HPF (0-2)   


 


Urine WBC


 


 Rare /HPF


(0-4) 


 





 


Urine Squamous Epithelial


Cells 


 Few /LPF 


 


 





 


Urine Bacteria  0 /HPF (0-FEW)   


 


Urine Mucus  Slight /LPF   


 


Sodium Level


 


 


 143 mmol/L


(136-145) 143 mmol/L


(136-145)


 


Potassium Level


 


 


 4.6 mmol/L


(3.5-5.1) 4.2 mmol/L


(3.5-5.1)


 


Chloride Level


 


 


 106 mmol/L


() 107 mmol/L


()


 


Carbon Dioxide Level


 


 


 29 mmol/L


(21-32) 27 mmol/L


(21-32)


 


Anion Gap   8 (6-14)  9 (6-14) 


 


Blood Urea Nitrogen


 


 


 32 mg/dL


(8-26) 29 mg/dL


(8-26)


 


Creatinine


 


 


 2.2 mg/dL


(0.7-1.3) 2.0 mg/dL


(0.7-1.3)


 


Estimated GFR


(Cockcroft-Gault) 


 


 34.4 


 38.3 





 


BUN/Creatinine Ratio   15 (6-20)  15 (6-20) 


 


Glucose Level


 


 


 111 mg/dL


(70-99) 100 mg/dL


(70-99)


 


Calcium Level


 


 


 8.7 mg/dL


(8.5-10.1) 8.6 mg/dL


(8.5-10.1)


 


Total Bilirubin


 


 


 0.2 mg/dL


(0.2-1.0) 0.2 mg/dL


(0.2-1.0)


 


Aspartate Amino Transf


(AST/SGOT) 


 


 20 U/L (15-37) 


 19 U/L (15-37) 





 


Alanine Aminotransferase


(ALT/SGPT) 


 


 17 U/L (16-63) 


 16 U/L (16-63) 





 


Alkaline Phosphatase


 


 


 88 U/L


() 68 U/L


()


 


Troponin I Quantitative


 


 


 < 0.017 ng/mL


(0.000-0.055) 





 


NT-Pro-B-Type Natriuretic


Peptide 


 


 162 pg/mL


(0-449) 





 


Total Protein


 


 


 7.8 g/dL


(6.4-8.2) 7.5 g/dL


(6.4-8.2)


 


Albumin


 


 


 3.8 g/dL


(3.4-5.0) 3.4 g/dL


(3.4-5.0)


 


Albumin/Globulin Ratio   1.0 (1.0-1.7)  0.8 (1.0-1.7) 








Laboratory Tests








Test


 19


20:10 19


20:51 19


20:54 12/10/19


04:23


 


White Blood Count


 6.7 x10^3/uL


(4.0-11.0) 


 


 6.5 x10^3/uL


(4.0-11.0)


 


Red Blood Count


 4.25 x10^6/uL


(4.30-5.70) 


 


 4.17 x10^6/uL


(4.30-5.70)


 


Hemoglobin


 12.1 g/dL


(13.0-17.5) 


 


 11.6 g/dL


(13.0-17.5)


 


Hematocrit


 36.7 %


(39.0-53.0) 


 


 36.2 %


(39.0-53.0)


 


Mean Corpuscular Volume 86 fL ()    87 fL () 


 


Mean Corpuscular Hemoglobin 28 pg (25-35)    28 pg (25-35) 


 


Mean Corpuscular Hemoglobin


Concent 33 g/dL


(31-37) 


 


 32 g/dL


(31-37)


 


Red Cell Distribution Width


 14.3 %


(11.5-14.5) 


 


 13.9 %


(11.5-14.5)


 


Platelet Count


 137 x10^3/uL


(140-400) 


 


 127 x10^3/uL


(140-400)


 


Neutrophils (%) (Auto) 52 % (31-73)    52 % (31-73) 


 


Lymphocytes (%) (Auto) 32 % (24-48)    31 % (24-48) 


 


Monocytes (%) (Auto) 13 % (0-9)    13 % (0-9) 


 


Eosinophils (%) (Auto) 3 % (0-3)    3 % (0-3) 


 


Basophils (%) (Auto) 1 % (0-3)    1 % (0-3) 


 


Neutrophils # (Auto)


 3.5 x10^3/uL


(1.8-7.7) 


 


 3.3 x10^3/uL


(1.8-7.7)


 


Lymphocytes # (Auto)


 2.1 x10^3/uL


(1.0-4.8) 


 


 2.0 x10^3/uL


(1.0-4.8)


 


Monocytes # (Auto)


 0.9 x10^3/uL


(0.0-1.1) 


 


 0.9 x10^3/uL


(0.0-1.1)


 


Eosinophils # (Auto)


 0.2 x10^3/uL


(0.0-0.7) 


 


 0.2 x10^3/uL


(0.0-0.7)


 


Basophils # (Auto)


 0.0 x10^3/uL


(0.0-0.2) 


 


 0.1 x10^3/uL


(0.0-0.2)


 


Urine Collection Type  Unknown   


 


Urine Color  Yellow   


 


Urine Clarity  Clear   


 


Urine pH  6.5   


 


Urine Specific Gravity  1.020   


 


Urine Protein


 


 Negative mg/dL


(NEG-TRACE) 


 





 


Urine Glucose (UA)


 


 Negative mg/dL


(NEG) 


 





 


Urine Ketones (Stick)


 


 Negative mg/dL


(NEG) 


 





 


Urine Blood  Negative (NEG)   


 


Urine Nitrite  Negative (NEG)   


 


Urine Bilirubin  Negative (NEG)   


 


Urine Urobilinogen Dipstick


 


 0.2 mg/dL (0.2


mg/dL) 


 





 


Urine Leukocyte Esterase  Negative (NEG)   


 


Urine RBC  Occ /HPF (0-2)   


 


Urine WBC


 


 Rare /HPF


(0-4) 


 





 


Urine Squamous Epithelial


Cells 


 Few /LPF 


 


 





 


Urine Bacteria  0 /HPF (0-FEW)   


 


Urine Mucus  Slight /LPF   


 


Sodium Level


 


 


 143 mmol/L


(136-145) 143 mmol/L


(136-145)


 


Potassium Level


 


 


 4.6 mmol/L


(3.5-5.1) 4.2 mmol/L


(3.5-5.1)


 


Chloride Level


 


 


 106 mmol/L


() 107 mmol/L


()


 


Carbon Dioxide Level


 


 


 29 mmol/L


(21-32) 27 mmol/L


(21-32)


 


Anion Gap   8 (6-14)  9 (6-14) 


 


Blood Urea Nitrogen


 


 


 32 mg/dL


(8-26) 29 mg/dL


(8-26)


 


Creatinine


 


 


 2.2 mg/dL


(0.7-1.3) 2.0 mg/dL


(0.7-1.3)


 


Estimated GFR


(Cockcroft-Gault) 


 


 34.4 


 38.3 





 


BUN/Creatinine Ratio   15 (6-20)  15 (6-20) 


 


Glucose Level


 


 


 111 mg/dL


(70-99) 100 mg/dL


(70-99)


 


Calcium Level


 


 


 8.7 mg/dL


(8.5-10.1) 8.6 mg/dL


(8.5-10.1)


 


Total Bilirubin


 


 


 0.2 mg/dL


(0.2-1.0) 0.2 mg/dL


(0.2-1.0)


 


Aspartate Amino Transf


(AST/SGOT) 


 


 20 U/L (15-37) 


 19 U/L (15-37) 





 


Alanine Aminotransferase


(ALT/SGPT) 


 


 17 U/L (16-63) 


 16 U/L (16-63) 





 


Alkaline Phosphatase


 


 


 88 U/L


() 68 U/L


()


 


Troponin I Quantitative


 


 


 < 0.017 ng/mL


(0.000-0.055) 





 


NT-Pro-B-Type Natriuretic


Peptide 


 


 162 pg/mL


(0-449) 





 


Total Protein


 


 


 7.8 g/dL


(6.4-8.2) 7.5 g/dL


(6.4-8.2)


 


Albumin


 


 


 3.8 g/dL


(3.4-5.0) 3.4 g/dL


(3.4-5.0)


 


Albumin/Globulin Ratio   1.0 (1.0-1.7)  0.8 (1.0-1.7) 











Images


Images


Report reviewed, images independently reviewed.


[facility]  


                              [address] [place] [address]  


                                           [phone]  


 


                                           IMAGING REPORT  


 


                                               Signed  


 


PATIENT: MARIA ELENA BYNUM       ACCOUNT: [id number]            MRN#: 

[id number]  


: 1931                  LOCATION:  [facility]                AGE: 88  


SEX: M                           EXAM DT: 19                ACCESSION#: 

[id number]  


STATUS: ADM IN                   ORD. PHYSICIAN: MYCHAL SADLER MD  


REASON: knee pain - unknown injury, dementia  


PROCEDURE: KNEE BILAT 3V  


 


KNEE BILAT 3V  


 


 History: Knee pain, unknown if injury  


 


 Comparison: None.  


 


 Findings:  


 3 views of the bilateral knees vertebral 6 views are submitted. No acute   


 fracture is identified. There is severe narrowing of the medial   


 compartment joint space of the left knee, to lesser degree of the   


 patellofemoral articulation. There is mild osteophyte formation of the   


 lateral compartment of the left knee. There is vascular calcification.   


 There is severe narrowing of the medial compartment joint space of the   


 right knee, to lesser degree of the patellofemoral articulation, minimal   


 osteoarthritic change of the left compartment of the right knee.  


 


 Impression:   


 


 1.  There is tricompartmental osteoarthritic change of the bilateral   


 knees, severe narrowing of the medial compartment joint spaces.  


 


 Electronically signed by: Edinson Michel MD (12/10/2019 12:21 AM)   


 [facility]  


 


 


 


 


DICTATED and SIGNED BY:     EDINSON MICHEL MD  


DATE:     12/10/19 0021





Assessment/Plan


Assessment/Plan


Severe difficulty walking. He has severe bilateral knee osteoarthritis. He has 

good pulses and I don't see is any vascular component to his difficulty walking.

There could be an element of lumbar spinal stenosis but I think we should treat 

the obvious knee arthritis first and reassess. I recommended cortisone 

injections into each knee and physical therapy for strengthening. I briefly 

discussed knee replacement surgery but I told him and his son that I think the 

risks are too high at his age for such surgery and I recommend nonoperative 

treatment. They agreed with that plan.











MARINA SHAH MD                 Dec 10, 2019 18:32

## 2019-12-10 NOTE — RAD
PORTABLE CHEST 1V

 

History: Shortness of breath

 

Comparison: July 23, 2015.

 

Findings:

Single view of the chest is submitted. 

There is no infiltrate, pneumothorax, or effusion. The pericardial cardiac

silhouette is within normal limits in size. There is somewhat tortuous 

thoracic aorta, atherosclerotic calcification near arch. 

 

Impression: 

 

1.  There is no significant infiltrate.

 

Electronically signed by: James Mock MD (12/10/2019 12:11 AM) 

[facility]

## 2019-12-10 NOTE — EKG
[facility]

              [address] [place] [address]

Test Date:    2019               Test Time:    20:26:46

Pat Name:     MARIA ELENA BYNUM          Department:   

Patient ID:   [id number]           Room:         578 1

Gender:       M                        Technician:   

:          [id number]               Requested By: MYCHAL SADLER

Order Number: [id number]           Reading MD:   Alex Pollock MD

                                 Measurements

Intervals                              Axis          

Rate:         89                       P:            48

IL:           216                      QRS:          -41

QRSD:         112                      T:            75

QT:           346                                    

QTc:          427                                    

                           Interpretive Statements

SINUS RHYTHM

PROLONGED IL INTERVAL

ABNORMAL LEFT AXIS DEVIATION

LEFT ANTERIOR FASCICULAR BLOCK

LEFT VENTRICULAR HYPERTROPHY



Electronically Signed On 12- 13:32:31 CST by Alex Pollock MD

## 2019-12-11 VITALS — DIASTOLIC BLOOD PRESSURE: 78 MMHG | SYSTOLIC BLOOD PRESSURE: 144 MMHG

## 2019-12-11 VITALS — DIASTOLIC BLOOD PRESSURE: 83 MMHG | SYSTOLIC BLOOD PRESSURE: 128 MMHG

## 2019-12-11 VITALS — SYSTOLIC BLOOD PRESSURE: 172 MMHG | DIASTOLIC BLOOD PRESSURE: 78 MMHG

## 2019-12-11 VITALS — DIASTOLIC BLOOD PRESSURE: 78 MMHG | SYSTOLIC BLOOD PRESSURE: 170 MMHG

## 2019-12-11 VITALS — DIASTOLIC BLOOD PRESSURE: 84 MMHG | SYSTOLIC BLOOD PRESSURE: 151 MMHG

## 2019-12-11 VITALS — DIASTOLIC BLOOD PRESSURE: 95 MMHG | SYSTOLIC BLOOD PRESSURE: 174 MMHG

## 2019-12-11 LAB
ALBUMIN SERPL-MCNC: 3.5 G/DL (ref 3.4–5)
ANION GAP SERPL CALC-SCNC: 9 MMOL/L (ref 6–14)
BUN SERPL-MCNC: 27 MG/DL (ref 8–26)
CALCIUM SERPL-MCNC: 8.4 MG/DL (ref 8.5–10.1)
CHLORIDE SERPL-SCNC: 103 MMOL/L (ref 98–107)
CHOLEST SERPL-MCNC: 190 MG/DL (ref 0–200)
CHOLEST/HDLC SERPL: 5 {RATIO}
CO2 SERPL-SCNC: 27 MMOL/L (ref 21–32)
CREAT SERPL-MCNC: 2.1 MG/DL (ref 0.7–1.3)
GFR SERPLBLD BASED ON 1.73 SQ M-ARVRAT: 36.2 ML/MIN
GLUCOSE SERPL-MCNC: 86 MG/DL (ref 70–99)
HDLC SERPL-MCNC: 38 MG/DL (ref 40–60)
LDLC: 127 MG/DL (ref 0–100)
PHOSPHATE SERPL-MCNC: 3.3 MG/DL (ref 2.6–4.7)
POTASSIUM SERPL-SCNC: 4 MMOL/L (ref 3.5–5.1)
SODIUM SERPL-SCNC: 139 MMOL/L (ref 136–145)
TRIGL SERPL-MCNC: 124 MG/DL (ref 0–150)
VLDLC: 25 MG/DL (ref 0–40)

## 2019-12-11 RX ADMIN — ENOXAPARIN SODIUM SCH MG: 100 INJECTION SUBCUTANEOUS at 08:29

## 2019-12-11 RX ADMIN — ASPIRIN SCH MG: 81 TABLET, COATED ORAL at 17:00

## 2019-12-11 RX ADMIN — TIMOLOL MALEATE SCH DROP: 5 SOLUTION/ DROPS OPHTHALMIC at 21:13

## 2019-12-11 RX ADMIN — TAMSULOSIN HYDROCHLORIDE SCH MG: 0.4 CAPSULE ORAL at 21:13

## 2019-12-11 RX ADMIN — DONEPEZIL HYDROCHLORIDE SCH MG: 10 TABLET, FILM COATED ORAL at 08:27

## 2019-12-11 RX ADMIN — BACITRACIN SCH MLS/HR: 5000 INJECTION, POWDER, FOR SOLUTION INTRAMUSCULAR at 14:13

## 2019-12-11 RX ADMIN — LISINOPRIL SCH MG: 10 TABLET ORAL at 08:28

## 2019-12-11 RX ADMIN — SIMVASTATIN SCH MG: 40 TABLET, FILM COATED ORAL at 21:13

## 2019-12-11 RX ADMIN — MEMANTINE HYDROCHLORIDE SCH MG: 10 TABLET ORAL at 08:27

## 2019-12-11 NOTE — PDOC2
BHAVESH MENSAH APRN 12/11/19 1217:


CARDIAC CONSULT


DATE OF CONSULT


Date of Consult


DATE: 12/11/19 


TIME: 12:11





REASON FOR CONSULT


Reason for Consult:


Dyspnea upon exertion





REFERRING PHYSICIAN


Referring Physician:


Dr. Weeks





SOURCE


Source:  Chart review, Patient





HISTORY OF PRESENT ILLNESS


HISTORY OF PRESENT ILLNESS


This is an 89 yo male who presented secondary to shortness of breath with 

exertion. Has a history of dementia; is poor historian. Son reports increase 

shortness of breath with exertion and weakness in his legs for the last month or

so. No chest pain, palpitations, dizziness, diaphoresis, or nausea/vomiting. Has

had decreased intake recently.





PAST MEDICAL HISTORY


Past Medical History


Cardiovascular:  HTN, Hyperlipidemia


Pulmonary:  No pertinent hx


CENTRAL NERVOUS SYSTEM:  Dementia (Alzheimer's mild)


GI:  GERD, Peptic Ulcer disease


Heme/Onc:  No pertinent hx


Hepatobiliary:  No pertinent hx


Psych:  No pertinent hx


Musculoskeletal:  Osteoarthritis


Rheumatologic:  No pertinent hx


Infectious disease:  No pertinent hx


ENT:  Allergic Rhinitis


Renal/:  BPH


Endocrine:  No pertinent hx


Dermatology:  No pertinent hx





PAST SURGICAL HISTORY


Past Surgical History:  Cataract Removal





FAMILY HISTORY


Family History:  Hypertension





SOCIAL HISTORY


Social History


Smoke:  No


ALCOHOL:  none


Drugs:  None


Lives:  alone, family there daily





CURRENT MEDICATIONS


CURRENT MEDICATIONS





Current Medications








 Medications


  (Trade)  Dose


 Ordered  Sig/Fili


 Route


 PRN Reason  Start Time


 Stop Time Status Last Admin


Dose Admin


 


 Donepezil HCl


  (Aricept)  10 mg  DAILY08


 PO


   12/10/19 16:30


    12/11/19 08:27





 


 Lisinopril


  (Prinivil)  10 mg  DAILY08


 PO


   12/10/19 16:30


    12/11/19 08:28





 


 Memantine


  (Namenda)  10 mg  DAILY08


 PO


   12/10/19 16:30


    12/11/19 08:27





 


 Simvastatin


  (Zocor)  40 mg  HS


 PO


   12/10/19 21:00


    12/10/19 21:19





 


 Tamsulosin HCl


  (Flomax)  0.4 mg  HS


 PO


   12/10/19 21:00


    12/10/19 21:19





 


 Timolol Maleate


  (Timoptic 0.5%


 Oph)  1 drop  HS


 OU


   12/10/19 21:00


    12/10/19 21:20





 


 Acetaminophen


  (Tylenol)  650 mg  PRN Q4HRS  PRN


 PO


 TEMP OVER 100.4F OR MILD PAIN  12/10/19 22:45


    12/11/19 04:18





 


 Enoxaparin Sodium


  (Lovenox 30mg


 Syringe)  30 mg  DAILY


 SQ


   12/11/19 09:00


    12/11/19 08:29














ALLERGIES


ALLERGIES:  


Coded Allergies:  


     No Known Drug Allergies (Unverified , 11/7/14)





ROS


Review of System


14 point ROS conducted with pertinent positives noted above in HPI





PHYSICAL EXAM


General:  Alert, Cooperative, No acute distress


HEENT:  Atraumatic


Lungs:  Clear to auscultation


Heart:  Regular rate, Normal S1, Normal S2


Abdomen:  Soft, No tenderness


Extremities:  No edema, Normal pulses


Skin:  No significant lesion


Neuro:  Normal speech, Sensation intact


Psych/Mental Status:  Mood NL


MUSCULOSKELETAL:  Osteoarthritic changes both hands





VITALS/I&O


VITALS/I&O:





                                   Vital Signs








  Date Time  Temp Pulse Resp B/P (MAP) Pulse Ox O2 Delivery O2 Flow Rate FiO2


 


12/11/19 11:00 97.9 76 18 144/78 (100) 95 Room Air  





 97.9       














                                    I & O   


 


 12/10/19 12/10/19 12/11/19





 15:00 23:00 07:00


 


Intake Total  200 ml 50 ml


 


Output Total 200 ml 200 ml 


 


Balance -200 ml 0 ml 50 ml











LABS


Lab:





                                Laboratory Tests








Test


 12/11/19


03:35


 


Sodium Level


 139 mmol/L


(136-145)


 


Potassium Level


 4.0 mmol/L


(3.5-5.1)


 


Chloride Level


 103 mmol/L


()


 


Carbon Dioxide Level


 27 mmol/L


(21-32)


 


Anion Gap 9 (6-14)  


 


Blood Urea Nitrogen


 27 mg/dL


(8-26)  H


 


Creatinine


 2.1 mg/dL


(0.7-1.3)  H


 


Estimated GFR


(Cockcroft-Gault) 36.2  





 


Glucose Level


 86 mg/dL


(70-99)


 


Calcium Level


 8.4 mg/dL


(8.5-10.1)  L


 


Phosphorus Level


 3.3 mg/dL


(2.6-4.7)


 


Albumin


 3.5 g/dL


(3.4-5.0)





                                Laboratory Tests


12/11/19 03:35











ECHOCARDIOGRAM


ECHOCARDIOGRAM


<Conclusion>


Low normal LV systolic function. EF 50-55%


No significant valvular abnormalities





DATE:     07/24/15 1107





ASSESSMENT/PLAN


ASSESSMENT/PLAN


1.  Dyspnea upon exertion; No evidence of over HF


2.  Weakness. 


3.  SABRA, dehydration


4.  Hypertension; labile 


5.  Hyperlipidemia


6.  Dementia 





Recommendations





IVFs


Echo to assess LV systolic function 


lipids, statin 


ASA


Supportive care





QUIQUE LARA MD 12/11/19 8066:


CARDIAC CONSULT


ASSESSMENT/PLAN


ASSESSMENT/PLAN


Patient seen and examined. Agree with NP's assessment and plan.


Physical exam and chest x-ray findings not consistent with CHF


2-D echo showed low normal LV function with ejection fraction estimated at 50%


Continue management of acute renal insufficiency per nephrology team


We will consider ischemic evaluation as an outpatient


Thank you for your consultation











BHAVESH MENSAH           Dec 11, 2019 12:17


QUIQUE LARA MD           Dec 11, 2019 16:49

## 2019-12-11 NOTE — CARD
MR#: [id number]

Account#: [id number]

Accession#: [id number]

Date of Study: 12/11/2019

Ordering Physician: RAMON WRIGHT, 

Referring Physician: RAMON WRIGHT, 

Tech: Yumiko Carrillo





--------------- APPROVED REPORT --------------





EXAM: Two-dimensional and M-mode echocardiogram with Doppler and color Doppler.



Other Information 

Quality : AverageHR: 73bpm



INDICATION

Dyspnea 



2D DIMENSIONS 

Left Atrium(2D)3.1 (1.6-4.0cm)IVSd1.2 (0.7-1.1cm)

Aortic Root(2D)3.3 (2.0-3.7cm)LVDd4.9 (3.9-5.9cm)

LVOT Diameter2.3 (1.8-2.4cm)PWd1.3 (0.7-1.1cm)

LVDs3.1 (2.5-4.0cm)FS (%) 34.7 %

SV67.2 mlLVEF(%)63.8 (>50%)



Aortic Valve

AoV Peak Ben.88.4cm/sAoV VTI17.4cm

AO Peak GR.3.1mmHgLVOT  VTI 15.14cm

AO Mean GR.2mmHgAI P 1/2 [id number]



Mitral Valve

MV E Ddidrgro56.5cm/sMV DECEL [id number]

MV A Ymawcmoj41.3cm/sE/A  Ratio0.6



TDI

Lateral E' P. V5.15cm/sMedial E' P. V3.60cm/s

E/Lateral E'7.9E/Medial E'11.3



Tricuspid Valve

TR P. Uthnxyci079os/sTR Peak Gr.21mmHg



 LEFT VENTRICLE 

The left ventricle is normal size. There is mild to moderate concentric left ventricular hypertrophy.
 The left ventricular systolic function is low normal. The Ejection Fraction is 50%. Transmitral Dopp
ler flow pattern is Grade I-abnormal relaxation pattern.



 RIGHT VENTRICLE 

The right ventricle is normal size. There is normal right ventricular wall thickness. The right ventr
icular systolic function is normal.



 ATRIA 

The left atrium size is normal. The right atrium size is normal. The interatrial septum is intact wit
h no evidence for an atrial septal defect or patent foramen ovale as noted on 2-D or Doppler imaging.




 AORTIC VALVE 

The aortic valve is thickened but opens well. Doppler and Color Flow revealed trace aortic regurgitat
ion. There is no significant aortic valvular stenosis.



 MITRAL VALVE 

The mitral valve is normal in structure and function. There is no evidence of mitral valve prolapse. 
There is no mitral valve stenosis. Doppler and Color-flow revealed trace mitral regurgitation.



 TRICUSPID VALVE 

The tricuspid valve is normal in structure and function. Doppler and Color Flow revealed trace tricus
pid regurgitation with an estimated PAP of 26 mmHg. There is no tricuspid valve stenosis.



 PULMONIC VALVE 

The pulmonic valve is not well visualized. Doppler and Color Flow revealed trace pulmonic valvular re
gurgitation.



 GREAT VESSELS 

The aortic root is normal in size. The IVC was not visualized.



 PERICARDIAL EFFUSION 

There is no evidence of significant pericardial effusion.



Critical Notification

Critical Value: No



<Conclusion>

The left ventricular systolic function is low normal.

The Ejection Fraction is 50%.

Transmitral Doppler flow pattern is Grade I-abnormal relaxation pattern.

Trace mitral regurgitation.

Trace tricuspid regurgitation with an estimated PAP of 26 mmHg.

There is no evidence of significant pericardial effusion.



Signed by : Mani Ordonez, 

Electronically Approved : 12/11/2019 16:46:31

## 2019-12-11 NOTE — NUR
SW following for discharge planning. Chart reviewed, discussed with RN. Pt is from home. PT 
recommending SNU, SW awaiting OT recommendations. It pt agreeable to SNU, will need one more 
midnight for Medicare. SW will continue to follow.

## 2019-12-11 NOTE — PDOC
PROGRESS NOTES


History of Present Illness


History of Present Illness





VTE Prophylaxis Ordered


VTE Prophylaxis Devices:  No


VTE Pharmacological Prophylaxi:  No





Assessment/Plan


Assessment/Plan


 Impression:  





   SABRA (acute kidney injury)


   KNEE PAIN


   GAIT INSTABILITY


   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 

   narrowing of the medial compartment joint spaces.


   Dehydration











ADMITTED





NEPHROLOGY CONSULT


ORTHO CONSULT


BEDREST


FALL PRECAUTIONS


IV FLUID SUPPORT


NO NSAIDS


FOLLOW RENAL FX CLOSELY


pt/ot





Vitals


Vitals





Vital Signs








  Date Time  Temp Pulse Resp B/P (MAP) Pulse Ox O2 Delivery O2 Flow Rate FiO2


 


12/11/19 08:28  76  170/78    


 


12/11/19 08:05     97 Room Air  


 


12/11/19 07:00 98.0  18     





 98.0       











Physical Exam


General:  Alert, Oriented X3, Cooperative, No acute distress


Heart:  Regular rate


Abdomen:  Normal bowel sounds, Soft


Extremities:  No cyanosis





Labs


LABS





Laboratory Tests








Test


 12/11/19


03:35


 


Sodium Level


 139 mmol/L


(136-145)


 


Potassium Level


 4.0 mmol/L


(3.5-5.1)


 


Chloride Level


 103 mmol/L


()


 


Carbon Dioxide Level


 27 mmol/L


(21-32)


 


Anion Gap 9 (6-14) 


 


Blood Urea Nitrogen


 27 mg/dL


(8-26)


 


Creatinine


 2.1 mg/dL


(0.7-1.3)


 


Estimated GFR


(Cockcroft-Gault) 36.2 





 


Glucose Level


 86 mg/dL


(70-99)


 


Calcium Level


 8.4 mg/dL


(8.5-10.1)


 


Phosphorus Level


 3.3 mg/dL


(2.6-4.7)


 


Albumin


 3.5 g/dL


(3.4-5.0)











Assessment and Plan


Assessmemt and Plan


Problems


Medical Problems:


(1) SABRA (acute kidney injury)


Status: Acute  





(2) Dehydration


Status: Acute  











Comment


Review of Relevant


I have reviewed the following items joey (where applicable) has been applied.


Labs





Laboratory Tests








Test


 12/9/19


20:10 12/9/19


20:51 12/9/19


20:54 12/10/19


04:23


 


White Blood Count


 6.7 x10^3/uL


(4.0-11.0) 


 


 6.5 x10^3/uL


(4.0-11.0)


 


Red Blood Count


 4.25 x10^6/uL


(4.30-5.70) 


 


 4.17 x10^6/uL


(4.30-5.70)


 


Hemoglobin


 12.1 g/dL


(13.0-17.5) 


 


 11.6 g/dL


(13.0-17.5)


 


Hematocrit


 36.7 %


(39.0-53.0) 


 


 36.2 %


(39.0-53.0)


 


Mean Corpuscular Volume 86 fL ()    87 fL () 


 


Mean Corpuscular Hemoglobin 28 pg (25-35)    28 pg (25-35) 


 


Mean Corpuscular Hemoglobin


Concent 33 g/dL


(31-37) 


 


 32 g/dL


(31-37)


 


Red Cell Distribution Width


 14.3 %


(11.5-14.5) 


 


 13.9 %


(11.5-14.5)


 


Platelet Count


 137 x10^3/uL


(140-400) 


 


 127 x10^3/uL


(140-400)


 


Neutrophils (%) (Auto) 52 % (31-73)    52 % (31-73) 


 


Lymphocytes (%) (Auto) 32 % (24-48)    31 % (24-48) 


 


Monocytes (%) (Auto) 13 % (0-9)    13 % (0-9) 


 


Eosinophils (%) (Auto) 3 % (0-3)    3 % (0-3) 


 


Basophils (%) (Auto) 1 % (0-3)    1 % (0-3) 


 


Neutrophils # (Auto)


 3.5 x10^3/uL


(1.8-7.7) 


 


 3.3 x10^3/uL


(1.8-7.7)


 


Lymphocytes # (Auto)


 2.1 x10^3/uL


(1.0-4.8) 


 


 2.0 x10^3/uL


(1.0-4.8)


 


Monocytes # (Auto)


 0.9 x10^3/uL


(0.0-1.1) 


 


 0.9 x10^3/uL


(0.0-1.1)


 


Eosinophils # (Auto)


 0.2 x10^3/uL


(0.0-0.7) 


 


 0.2 x10^3/uL


(0.0-0.7)


 


Basophils # (Auto)


 0.0 x10^3/uL


(0.0-0.2) 


 


 0.1 x10^3/uL


(0.0-0.2)


 


Urine Collection Type  Unknown   


 


Urine Color  Yellow   


 


Urine Clarity  Clear   


 


Urine pH  6.5   


 


Urine Specific Gravity  1.020   


 


Urine Protein


 


 Negative mg/dL


(NEG-TRACE) 


 





 


Urine Glucose (UA)


 


 Negative mg/dL


(NEG) 


 





 


Urine Ketones (Stick)


 


 Negative mg/dL


(NEG) 


 





 


Urine Blood  Negative (NEG)   


 


Urine Nitrite  Negative (NEG)   


 


Urine Bilirubin  Negative (NEG)   


 


Urine Urobilinogen Dipstick


 


 0.2 mg/dL (0.2


mg/dL) 


 





 


Urine Leukocyte Esterase  Negative (NEG)   


 


Urine RBC  Occ /HPF (0-2)   


 


Urine WBC


 


 Rare /HPF


(0-4) 


 





 


Urine Squamous Epithelial


Cells 


 Few /LPF 


 


 





 


Urine Bacteria  0 /HPF (0-FEW)   


 


Urine Mucus  Slight /LPF   


 


Sodium Level


 


 


 143 mmol/L


(136-145) 143 mmol/L


(136-145)


 


Potassium Level


 


 


 4.6 mmol/L


(3.5-5.1) 4.2 mmol/L


(3.5-5.1)


 


Chloride Level


 


 


 106 mmol/L


() 107 mmol/L


()


 


Carbon Dioxide Level


 


 


 29 mmol/L


(21-32) 27 mmol/L


(21-32)


 


Anion Gap   8 (6-14)  9 (6-14) 


 


Blood Urea Nitrogen


 


 


 32 mg/dL


(8-26) 29 mg/dL


(8-26)


 


Creatinine


 


 


 2.2 mg/dL


(0.7-1.3) 2.0 mg/dL


(0.7-1.3)


 


Estimated GFR


(Cockcroft-Gault) 


 


 34.4 


 38.3 





 


BUN/Creatinine Ratio   15 (6-20)  15 (6-20) 


 


Glucose Level


 


 


 111 mg/dL


(70-99) 100 mg/dL


(70-99)


 


Calcium Level


 


 


 8.7 mg/dL


(8.5-10.1) 8.6 mg/dL


(8.5-10.1)


 


Total Bilirubin


 


 


 0.2 mg/dL


(0.2-1.0) 0.2 mg/dL


(0.2-1.0)


 


Aspartate Amino Transf


(AST/SGOT) 


 


 20 U/L (15-37) 


 19 U/L (15-37) 





 


Alanine Aminotransferase


(ALT/SGPT) 


 


 17 U/L (16-63) 


 16 U/L (16-63) 





 


Alkaline Phosphatase


 


 


 88 U/L


() 68 U/L


()


 


Troponin I Quantitative


 


 


 < 0.017 ng/mL


(0.000-0.055) 





 


NT-Pro-B-Type Natriuretic


Peptide 


 


 162 pg/mL


(0-449) 





 


Total Protein


 


 


 7.8 g/dL


(6.4-8.2) 7.5 g/dL


(6.4-8.2)


 


Albumin


 


 


 3.8 g/dL


(3.4-5.0) 3.4 g/dL


(3.4-5.0)


 


Albumin/Globulin Ratio   1.0 (1.0-1.7)  0.8 (1.0-1.7) 


 


Test


 12/11/19


03:35 


 


 





 


Sodium Level


 139 mmol/L


(136-145) 


 


 





 


Potassium Level


 4.0 mmol/L


(3.5-5.1) 


 


 





 


Chloride Level


 103 mmol/L


() 


 


 





 


Carbon Dioxide Level


 27 mmol/L


(21-32) 


 


 





 


Anion Gap 9 (6-14)    


 


Blood Urea Nitrogen


 27 mg/dL


(8-26) 


 


 





 


Creatinine


 2.1 mg/dL


(0.7-1.3) 


 


 





 


Estimated GFR


(Cockcroft-Gault) 36.2 


 


 


 





 


Glucose Level


 86 mg/dL


(70-99) 


 


 





 


Calcium Level


 8.4 mg/dL


(8.5-10.1) 


 


 





 


Phosphorus Level


 3.3 mg/dL


(2.6-4.7) 


 


 





 


Albumin


 3.5 g/dL


(3.4-5.0) 


 


 











Laboratory Tests








Test


 12/11/19


03:35


 


Sodium Level


 139 mmol/L


(136-145)


 


Potassium Level


 4.0 mmol/L


(3.5-5.1)


 


Chloride Level


 103 mmol/L


()


 


Carbon Dioxide Level


 27 mmol/L


(21-32)


 


Anion Gap 9 (6-14) 


 


Blood Urea Nitrogen


 27 mg/dL


(8-26)


 


Creatinine


 2.1 mg/dL


(0.7-1.3)


 


Estimated GFR


(Cockcroft-Gault) 36.2 





 


Glucose Level


 86 mg/dL


(70-99)


 


Calcium Level


 8.4 mg/dL


(8.5-10.1)


 


Phosphorus Level


 3.3 mg/dL


(2.6-4.7)


 


Albumin


 3.5 g/dL


(3.4-5.0)








Medications





Current Medications


Ondansetron HCl (Zofran) 4 mg PRN Q8HRS  PRN IV NAUSEA/VOMITING 1ST CHOICE;  

Start 12/9/19 at 22:30;  Stop 12/10/19 at 22:29;  Status DC


Acetaminophen (Tylenol) 650 mg PRN Q4HRS  PRN PO FEVER;  Start 12/9/19 at 22:30;

 Stop 12/10/19 at 22:29;  Status DC


Sodium Chloride 1,000 ml @  75 mls/hr 1X  ONCE IV  Last administered on 

12/9/19at 23:36;  Start 12/9/19 at 23:00;  Stop 12/10/19 at 12:19;  Status DC


Donepezil HCl (Aricept) 10 mg DAILY08 PO  Last administered on 12/11/19at 08:27;

 Start 12/10/19 at 16:30


Lisinopril (Prinivil) 10 mg DAILY08 PO  Last administered on 12/11/19at 08:28;  

Start 12/10/19 at 16:30


Memantine (Namenda) 10 mg DAILY08 PO  Last administered on 12/11/19at 08:27;  

Start 12/10/19 at 16:30


Simvastatin (Zocor) 40 mg HS PO  Last administered on 12/10/19at 21:19;  Start 

12/10/19 at 21:00


Tamsulosin HCl (Flomax) 0.4 mg HS PO  Last administered on 12/10/19at 21:19;  

Start 12/10/19 at 21:00


Timolol Maleate (Timoptic 0.5% [facility]) 1 drop HS OU  Last administered on 12

/10/19at 21:20;  Start 12/10/19 at 21:00


Sodium Chloride (Normal Saline Flush) 3 ml QSHIFT  PRN IV AFTER MEDS AND BLOOD 

DRAWS;  Start 12/10/19 at 22:45


Ondansetron HCl (Zofran) 4 mg PRN Q4HRS  PRN IV NAUSEA/VOMITING 1ST CHOICE;  

Start 12/10/19 at 22:45


Acetaminophen (Tylenol) 650 mg PRN Q4HRS  PRN PO TEMP OVER 100.4F OR MILD PAIN 

Last administered on 12/11/19at 04:18;  Start 12/10/19 at 22:45


Docusate Sodium (Colace) 100 mg PRN BID  PRN PO CONSTIPATION 1ST CHOICE;  Start 

12/10/19 at 22:45


Albuterol Sulfate (Ventolin Neb Soln) 2.5 mg PRN Q4HRS  PRN NEB SHORTNESS OF 

BREATH;  Start 12/10/19 at 22:45


Guaifenesin (Robitussin) 200 mg PRN Q4HRS  PRN PO COUGH;  Start 12/10/19 at 

22:45


Lorazepam (Ativan) 0.5 mg PRN Q4HRS  PRN PO ANXIETY / AGITATION;  Start 12/10/19

at 22:45


Enoxaparin Sodium (Lovenox 30mg Syringe) 30 mg DAILY SQ  Last administered on 

12/11/19at 08:29;  Start 12/11/19 at 09:00





Active Scripts


Active


Reported


Tamsulosin Hcl 0.4 Mg Cap.er.24h   


Namenda (Memantine Hcl) 10 Mg Tablet   


Timolol Maleate 10 Ml Drops   


Lisinopril 20 Mg Tablet   


Donepezil Hcl 10 Mg Tablet   


Simvastatin 40 Mg Tablet


Vitals/I & O





Vital Sign - Last 24 Hours








 12/10/19 12/10/19 12/10/19 12/10/19





 11:00 15:00 19:00 20:00


 


Temp 97.8 97.6 98.1 





 97.8 97.6 98.1 


 


Pulse 80 76 77 


 


Resp 18 18 20 


 


B/P (MAP) 142/76 (98) 157/84 (108) 160/93 (115) 


 


Pulse Ox 98 98 98 


 


O2 Delivery Room Air Room Air Room Air Room Air


 


    





    





 12/10/19 12/11/19 12/11/19 12/11/19





 23:00 03:00 07:00 08:00


 


Temp 98.0 98.3 98.0 





 98.0 98.3 98.0 


 


Pulse 75 78 74 


 


Resp 20 22 18 


 


B/P (MAP) 151/89 (109) 174/95 (121) 170/78 (108) 


 


Pulse Ox 98 96 97 


 


O2 Delivery Room Air Room Air Room Air Room Air


 


    





    





 12/11/19 12/11/19  





 08:05 08:28  


 


Pulse  76  


 


B/P (MAP)  170/78  


 


Pulse Ox 97   


 


O2 Delivery Room Air   














Intake and Output   


 


 12/10/19 12/10/19 12/11/19





 15:00 23:00 07:00


 


Intake Total  200 ml 50 ml


 


Output Total 200 ml 200 ml 


 


Balance -200 ml 0 ml 50 ml

















RAMON WRIGHT MD          Dec 11, 2019 10:24

## 2019-12-12 VITALS — DIASTOLIC BLOOD PRESSURE: 58 MMHG | SYSTOLIC BLOOD PRESSURE: 124 MMHG

## 2019-12-12 VITALS — SYSTOLIC BLOOD PRESSURE: 154 MMHG | DIASTOLIC BLOOD PRESSURE: 83 MMHG

## 2019-12-12 VITALS — SYSTOLIC BLOOD PRESSURE: 126 MMHG | DIASTOLIC BLOOD PRESSURE: 84 MMHG

## 2019-12-12 VITALS — SYSTOLIC BLOOD PRESSURE: 140 MMHG | DIASTOLIC BLOOD PRESSURE: 76 MMHG

## 2019-12-12 VITALS — DIASTOLIC BLOOD PRESSURE: 95 MMHG | SYSTOLIC BLOOD PRESSURE: 145 MMHG

## 2019-12-12 VITALS — DIASTOLIC BLOOD PRESSURE: 88 MMHG | SYSTOLIC BLOOD PRESSURE: 151 MMHG

## 2019-12-12 RX ADMIN — LISINOPRIL SCH MG: 10 TABLET ORAL at 08:34

## 2019-12-12 RX ADMIN — MEMANTINE HYDROCHLORIDE SCH MG: 10 TABLET ORAL at 08:34

## 2019-12-12 RX ADMIN — ASPIRIN SCH MG: 81 TABLET, COATED ORAL at 08:34

## 2019-12-12 RX ADMIN — SIMVASTATIN SCH MG: 40 TABLET, FILM COATED ORAL at 21:39

## 2019-12-12 RX ADMIN — BACITRACIN SCH MLS/HR: 5000 INJECTION, POWDER, FOR SOLUTION INTRAMUSCULAR at 04:00

## 2019-12-12 RX ADMIN — TAMSULOSIN HYDROCHLORIDE SCH MG: 0.4 CAPSULE ORAL at 21:38

## 2019-12-12 RX ADMIN — DONEPEZIL HYDROCHLORIDE SCH MG: 10 TABLET, FILM COATED ORAL at 08:34

## 2019-12-12 RX ADMIN — TIMOLOL MALEATE SCH DROP: 5 SOLUTION/ DROPS OPHTHALMIC at 21:38

## 2019-12-12 RX ADMIN — BACITRACIN SCH MLS/HR: 5000 INJECTION, POWDER, FOR SOLUTION INTRAMUSCULAR at 17:14

## 2019-12-12 RX ADMIN — ENOXAPARIN SODIUM SCH MG: 100 INJECTION SUBCUTANEOUS at 08:35

## 2019-12-12 NOTE — NUR
ZAFAR following. Discussed with RN, PT/OT recommending SNU. ZAFAR met with pt, pt is not really 
wanting to go to SNU and feels as though he will be fine at home. Pt reported his wife 
passed away recently and his sons and other family members are always at the house with him. 
Pt gave permission for SW to contact his children, Silvio ([phone]) and Kd 
([phone]). Silvio reported [facility], questioned whether [facility] is [facility] affiliated. ZAFAR advised Silvio to contact 
pt's [facility] , as pt is not showing as VA insurance for this admission. Silvio gave 
permission for ZAFAR to fax facesheet to [facility] to run benefits. ZAFAR awaiting call 
back from [facility]. RN notified. 

-------------------------------------------------------------------------------

Addendum: 12/12/19 at 1423 by NELSON STEPHEN

-------------------------------------------------------------------------------

ZAFAR left voicemail for pt's son, Silvio regarding insurance. ZAFAR met with pt, pt's sonSalomon 
at bedside. Salomon was under the impression pt would be going to [facility]. ZAFAR faxed 
referral to [facility] (ph: [phone], fax: [phone]). ZAFAR awaiting acceptance decision. RN 
notified.

## 2019-12-12 NOTE — PDOC2
CONSULT


Date of Consult


Date of Consult


DATE: 12/12/19 


TIME: 10:42





Reason for Consult


Reason for Consult:


RENAL FAILURE





Referring Physician


Referring Physician:


CINDI





Identification/Chief Complaint


Chief Complaint


SOB AND KNEE PAIN





Source


Source:  Chart review





History of Present Illness


Reason for Visit:





THIS  IS AN 80 YR OLD WITH COMPLAINTS OF SOB. PT IS ALSO HAVING BILATERAL KNEE 

PAIN. HE IS UNDERGOING EVAL BY CARDIOLOGY AND ORTHO. RENAL CONSULT ASKED DUE TO 

A CR OF 2.1. NO HX OF ANY KIDNEY OR BLADDER SURGERIES HEMATURIA DYSURIA NOTED. 

REVIEW OF RECORDS SHOWED. CKD WITH CR OF 1.9 IN 2015. HE IS ON FLOMAX FOR HIS 

BPH. HE HAS DEMENTIA AND NOT ABLE TO PROVIDE FULL HX. INFO OBTAINED FROM HIS 

CHART








Past Medical History


Cardiovascular:  HTN, Hyperlipidemia


Pulmonary:  No pertinent hx


CENTRAL NERVOUS SYSTEM:  Dementia


GI:  GERD, Peptic Ulcer disease


Heme/Onc:  No pertinent hx


Hepatobiliary:  No pertinent hx


Psych:  No pertinent hx


Musculoskeletal:  Osteoarthritis


Rheumatologic:  No pertinent hx


Infectious disease:  No pertinent hx


Renal/:  Chronic renal insuff


Endocrine:  No pertinent hx





Past Surgical History


Past Surgical History:  Cataract Removal





Family History


Family History:  Hypertension





Social History


No


ALCOHOL:  none


Drugs:  None


Lives:  with Family


Domestic Violence:  Neg





Current Problem List


Problem List


Problems


Medical Problems:


(1) SABRA (acute kidney injury)


Status: Acute  





(2) Dehydration


Status: Acute  











Current Medications


Current Medications





Current Medications


Ondansetron HCl (Zofran) 4 mg PRN Q8HRS  PRN IV NAUSEA/VOMITING 1ST CHOICE;  

Start 12/9/19 at 22:30;  Stop 12/10/19 at 22:29;  Status DC


Acetaminophen (Tylenol) 650 mg PRN Q4HRS  PRN PO FEVER;  Start 12/9/19 at 22:30;

 Stop 12/10/19 at 22:29;  Status DC


Sodium Chloride 1,000 ml @  75 mls/hr 1X  ONCE IV  Last administered on 

12/9/19at 23:36;  Start 12/9/19 at 23:00;  Stop 12/10/19 at 12:19;  Status DC


Donepezil HCl (Aricept) 10 mg DAILY08 PO  Last administered on 12/12/19at 08:34;

 Start 12/10/19 at 16:30


Lisinopril (Prinivil) 10 mg DAILY08 PO  Last administered on 12/12/19at 08:34;  

Start 12/10/19 at 16:30


Memantine (Namenda) 10 mg DAILY08 PO  Last administered on 12/12/19at 08:34;  

Start 12/10/19 at 16:30


Simvastatin (Zocor) 40 mg HS PO  Last administered on 12/11/19at 21:13;  Start 

12/10/19 at 21:00


Tamsulosin HCl (Flomax) 0.4 mg HS PO  Last administered on 12/11/19at 21:13;  

Start 12/10/19 at 21:00


Timolol Maleate (Timoptic 0.5% [facility]) 1 drop HS OU  Last administered on 

12/11/19at 21:13;  Start 12/10/19 at 21:00


Sodium Chloride (Normal Saline Flush) 3 ml QSHIFT  PRN IV AFTER MEDS AND BLOOD 

DRAWS;  Start 12/10/19 at 22:45


Ondansetron HCl (Zofran) 4 mg PRN Q4HRS  PRN IV NAUSEA/VOMITING 1ST CHOICE;  

Start 12/10/19 at 22:45


Acetaminophen (Tylenol) 650 mg PRN Q4HRS  PRN PO TEMP OVER 100.4F OR MILD PAIN 

Last administered on 12/11/19at 04:18;  Start 12/10/19 at 22:45


Docusate Sodium (Colace) 100 mg PRN BID  PRN PO CONSTIPATION 1ST CHOICE;  Start 

12/10/19 at 22:45


Albuterol Sulfate (Ventolin Neb Soln) 2.5 mg PRN Q4HRS  PRN NEB SHORTNESS OF 

BREATH;  Start 12/10/19 at 22:45


Guaifenesin (Robitussin) 200 mg PRN Q4HRS  PRN PO COUGH;  Start 12/10/19 at 

22:45


Lorazepam (Ativan) 0.5 mg PRN Q4HRS  PRN PO ANXIETY / AGITATION Last a

dministered on 12/11/19at 21:13;  Start 12/10/19 at 22:45


Enoxaparin Sodium (Lovenox 30mg Syringe) 30 mg DAILY SQ  Last administered on 

12/12/19at 08:35;  Start 12/11/19 at 09:00


Sodium Chloride 1,000 ml @  75 mls/hr [id number] IV  Last administered on 12/12/ 19at 04:00;  Start 12/11/19 at 14:15


Aspirin (Ecotrin) 81 mg DAILYWBKFT PO  Last administered on 12/12/19at 08:34;  

Start 12/11/19 at 17:00


Bupivacaine HCl (Sensorcaine-Mpf 0.25%) 10 ml 1X  ONCE IJ ;  Start 12/11/19 at 

19:30;  Stop 12/11/19 at 19:31;  Status DC


Methylprednisolone Acetate (DEPO-Medrol 80MG VIAL) 160 mg 1X  ONCE IM ;  Start 

12/11/19 at 19:30;  Stop 12/11/19 at 19:31;  Status DC





Active Scripts


Active


Reported


Tamsulosin Hcl 0.4 Mg Cap.er.24h   


Namenda (Memantine Hcl) 10 Mg Tablet   


Timolol Maleate 10 Ml Drops   


Lisinopril 20 Mg Tablet   


Donepezil Hcl 10 Mg Tablet   


Simvastatin 40 Mg Tablet





Allergies


Allergies:  


Coded Allergies:  


     No Known Drug Allergies (Unverified , 11/7/14)





ROS


Review of System


UNABLE TO OBTAIN





Physical Exam


General:  Alert, Cooperative, No acute distress


HEENT:  Atraumatic, EOMI, Mucous membr. moist/pink


Lungs:  Clear to auscultation


Heart:  Regular rate


Abdomen:  Normal bowel sounds, Soft, No tenderness


Extremities:  No clubbing


Skin:  No breakdown


Neuro:  Normal speech, Other


Psych/Mental Status:  Other (CONFUSED AFFECT)


MUSCULOSKELETAL:  Other (DIFFUSE MUSCLE ATROPHY)





Vitals


VITALS





Vital Signs








  Date Time  Temp Pulse Resp B/P (MAP) Pulse Ox O2 Delivery O2 Flow Rate FiO2


 


12/12/19 08:34  70  154/83    


 


12/12/19 08:00      Room Air  


 


12/12/19 07:00 97.7  16  96   





 97.7       











Labs


Labs





Laboratory Tests








Test


 12/11/19


03:35


 


Sodium Level


 139 mmol/L


(136-145)


 


Potassium Level


 4.0 mmol/L


(3.5-5.1)


 


Chloride Level


 103 mmol/L


()


 


Carbon Dioxide Level


 27 mmol/L


(21-32)


 


Anion Gap 9 (6-14) 


 


Blood Urea Nitrogen


 27 mg/dL


(8-26)


 


Creatinine


 2.1 mg/dL


(0.7-1.3)


 


Estimated GFR


(Cockcroft-Gault) 36.2 





 


Glucose Level


 86 mg/dL


(70-99)


 


Calcium Level


 8.4 mg/dL


(8.5-10.1)


 


Phosphorus Level


 3.3 mg/dL


(2.6-4.7)


 


Albumin


 3.5 g/dL


(3.4-5.0)


 


Triglycerides Level


 124 mg/dL


(0-150)


 


Cholesterol Level


 190 mg/dL


(0-200)


 


LDL Cholesterol, Calculated


 127 mg/dL


(0-100)


 


VLDL Cholesterol, Calculated


 25 mg/dL


(0-40)


 


Non-HDL Cholesterol Calculated


 152 mg/dL


(0-129)


 


HDL Cholesterol


 38 mg/dL


(40-60)


 


Cholesterol/HDL Ratio 5.0 











Assessment/Plan


Assessment/Plan


IMP





NICHOLAS


KNEE PAIN


HTN HX


CKD STAGE 3B TO 4-CR OF 2.1 IS NEAR HIS BASELINE-CR OF 1.9 IN 2015


BPH HX


DEMENTIA





PLAN





CARDIOLOGY EVAL


ORTHO EVAL


CONT FLOMAX


CONT ACE-I


WILL FOLLOW AS NEEDED











FERNANDO PENG MD                 Dec 12, 2019 10:48

## 2019-12-12 NOTE — PDOC
CARDIO Progress Notes


Date and Time


Date of Service


12/12/19


Time of Evaluation


1610





Subjective


Subjective:  No Chest Pain, No shortness of breath, No Palpitations, No 

Dizziness





Vitals


Vitals





Vital Signs








  Date Time  Temp Pulse Resp B/P (MAP) Pulse Ox O2 Delivery O2 Flow Rate FiO2


 


12/12/19 15:08 97.7 81 18 145/95 (112) 97 Room Air  





 97.7       








Weight


Weight [ ]





Input and Output


Intake and Output











Intake and Output 


 


 12/12/19





 07:00


 


Intake Total 120 ml


 


Balance 120 ml


 


 


 


Intake Oral 120 ml


 


# Voids 8


 


# Bowel Movements 3











Physical Exam


HEENT:  Neck Supple W Full Motion


LUNGS:  Clear to Auscultation


Heart:  S1S2, RRR


Abdomen:  Soft N/T


Extremities:  No Edema


Neurology:  alert, follow commands





Assessment


Assessment


1.  Dyspnea upon exertion; No evidence of over HF. Echo with LVEF 50%


2.  Weakness. 


3.  SABRA on CKD; Cr near baseline


4.  Hypertension; mildly elevated


5.  Hyperlipidemia


6.  Dementia 





Recommendations





ASA, statin therapy


increase lisinopril for BP control


Consider outpatient ischemic evaluation


Follow up with Dr. Ordonez upon discharge











BHAVESH MENSAH           Dec 12, 2019 16:55

## 2019-12-12 NOTE — PDOC
PROGRESS NOTES


History of Present Illness


History of Present Illness





VTE Prophylaxis Ordered


VTE Prophylaxis Devices:  No


VTE Pharmacological Prophylaxi:  No





DISCHARGE DX ==============


Assessment/Plan


 Impression:  





   SABRA (acute kidney injury)


   KNEE PAIN, INTRACTABLE 


   GAIT INSTABILITY


   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 

   narrowing of the medial compartment joint spaces.


   Dehydration


   The left ventricular systolic function is low normal.


            The Ejection Fraction is 50%.


            ECHO  Grade I-abnormal relaxation pattern. C/W DIASTOLIC CHF











ADMITTED





NEPHROLOGY CONSULT REVIEWED


ORTHO CONSULT


BEDREST PT/OT


FALL PRECAUTIONS


IV FLUID SUPPORT


NO NSAIDS


FOLLOW RENAL FX CLOSELY


pt/ot


D/C 





D/W SON IN ROOM











Discharge Recommendations 


* Skilled Nursing Unit


Discharge Recommendation - DME 


* Rolling Walker needed


* in order to complete ADLs


* and ambulation safely





Vitals


Vitals





Vital Signs








  Date Time  Temp Pulse Resp B/P (MAP) Pulse Ox O2 Delivery O2 Flow Rate FiO2


 


12/12/19 07:00 97.7 70 16 154/83 (106) 96 Room Air  





 97.7       











Physical Exam


General:  Alert, Cooperative, No acute distress


Heart:  Regular rate


Lungs:  Clear


Abdomen:  Normal bowel sounds, Soft, No tenderness


Extremities:  No cyanosis, Normal pulses, Other (He uses a walker and has a slow

cautious gait. The RIGHT knee shows a severe varus alignment.  No masses.  No 

detectable effusion.  Tenderness on the joint lines.  Range of motion is  

degrees.  There is crepitus with range of motion, and pain at the extremes of 

motion.  The knee is stable to varus and valgus stress without subluxation or 

laxity.  Muscle strength is slightly weak for the quadriceps 4+/5 which may be 

due to pain or avoidance, and does not seem neurogenic, and the muscle tone and 

bulk is slightly decreased. The hamstring strength is 5/5.   The skin is normal 

with no scars, rashes, lesions or ulcers. Light touch sensation is intact. No 

edema and no varicosities.  Dorsalis pedis pulse is intact and capillary refill 

is normal.)


Skin:  No breakdown, No significant lesion





Labs


LABS


ortic Valve


AoV Peak Ben.   88.4cm/s   AoV VTI      17.4cm


AO Peak GR.   3.1mmHg      LVOT  VTI    15.14cm


AO Mean GR.   2mmHg      AI P 1/2 Time   522ms





Mitral Valve


MV E Velocity   40.5cm/s   MV DECEL TIME   259ms


MV A Velocity   68.3cm/s   E/A  Ratio   0.6





TDI


Lateral E' P. V   5.15cm/s   Medial E' P. V   3.60cm/s


E/Lateral E'   7.9      E/Medial E'   11.3





Tricuspid Valve


TR P. Velocity   232cm/s   TR Peak Gr.   21mmHg





 LEFT VENTRICLE 


The left ventricle is normal size. There is mild to moderate concentric left 

ventricular hypertrophy. The left ventricular systolic function is low normal. 

The Ejection Fraction is 50%. Transmitral Doppler flow pattern is Grade I-

abnormal relaxation pattern.





 RIGHT VENTRICLE 


The right ventricle is normal size. There is normal right ventricular wall 

thickness. The right ventricular systolic function is normal.





 ATRIA 


The left atrium size is normal. The right atrium size is normal. The interatrial

septum is intact with no evidence for an atrial septal defect or patent foramen 

ovale as noted on 2-D or Doppler imaging.





 AORTIC VALVE 


The aortic valve is thickened but opens well. Doppler and Color Flow revealed 

trace aortic regurgitation. There is no significant aortic valvular stenosis.





 MITRAL VALVE 


The mitral valve is normal in structure and function. There is no evidence of 

mitral valve prolapse. There is no mitral valve stenosis. Doppler and Color-flow

revealed trace mitral regurgitation.





 TRICUSPID VALVE 


The tricuspid valve is normal in structure and function. Doppler and Color Flow 

revealed trace tricuspid regurgitation with an estimated PAP of 26 mmHg. There 

is no tricuspid valve stenosis.





 PULMONIC VALVE 


The pulmonic valve is not well visualized. Doppler and Color Flow revealed trace

pulmonic valvular regurgitation.





 GREAT VESSELS 


The aortic root is normal in size. The IVC was not visualized.





 PERICARDIAL EFFUSION 


There is no evidence of significant pericardial effusion.





Critical Notification


Critical Value: No





<Conclusion>


The left ventricular systolic function is low normal.


The Ejection Fraction is 50%.


Transmitral Doppler flow pattern is Grade I-abnormal relaxation pattern.


Trace mitral regurgitation.


Trace tricuspid regurgitation with an estimated PAP of 26 mmHg.


There is no evidence of significant pericardial effusion.





Signed by : Quique Ordonez, 


Electronically Approved : 12/11/2019 16:46:31














DICTATED and SIGNED BY:     QUIQUE ORDONEZ MD


DATE:     12/11/19 1638





Assessment and Plan


Assessmemt and Plan


Problems


Medical Problems:


(1) SABRA (acute kidney injury)


Status: Acute  





(2) Dehydration


Status: Acute  





Goal 1 Assessment 


* Appropriate - Continue


Goal 2 - Transfers Assistance Required 


* Independent


Goal 2 - Transfer Type 


* Sit to Stand


Goal 2 Assessment 


* Appropriate - Continue


Goal 3 - Ambulation Assistance Required 


* Independent


Goal 3 - Ambulation Distance 


* 100'


Goal 3 - Ambulation Device 


* Roller Walker


Goal 3 Assessment 


* Appropriate - Continue


Goal 4 - Stairs Assistance Required 


* Stand-by Assistance


Goal 4 - Number of Stairs 


* 5-9


Goal 4 - Device on Stairs 


* Rail on Right


Goal 4 Assessment 


* Appropriate - Continue


Treatment Plan 


* Therapeutic Exercise


* Bed Mobility Training


* Transfer training


* Gait Training


* Dynamic Balance Training


Frequency of Treatment Expected 


* 7 visits/week


Duration of Treatment Expected 


* 2 weeks


Discharge Recommendations 


* Skilled Nursing Unit


Discharge Recommendation - DME 


* Rolling Walker needed


* in order to complete ADLs


* and ambulation safely





Comment


Review of Relevant


I have reviewed the following items joey (where applicable) has been applied.


Labs





Laboratory Tests








Test


 12/11/19


03:35


 


Sodium Level


 139 mmol/L


(136-145)


 


Potassium Level


 4.0 mmol/L


(3.5-5.1)


 


Chloride Level


 103 mmol/L


()


 


Carbon Dioxide Level


 27 mmol/L


(21-32)


 


Anion Gap 9 (6-14) 


 


Blood Urea Nitrogen


 27 mg/dL


(8-26)


 


Creatinine


 2.1 mg/dL


(0.7-1.3)


 


Estimated GFR


(Cockcroft-Gault) 36.2 





 


Glucose Level


 86 mg/dL


(70-99)


 


Calcium Level


 8.4 mg/dL


(8.5-10.1)


 


Phosphorus Level


 3.3 mg/dL


(2.6-4.7)


 


Albumin


 3.5 g/dL


(3.4-5.0)


 


Triglycerides Level


 124 mg/dL


(0-150)


 


Cholesterol Level


 190 mg/dL


(0-200)


 


LDL Cholesterol, Calculated


 127 mg/dL


(0-100)


 


VLDL Cholesterol, Calculated


 25 mg/dL


(0-40)


 


Non-HDL Cholesterol Calculated


 152 mg/dL


(0-129)


 


HDL Cholesterol


 38 mg/dL


(40-60)


 


Cholesterol/HDL Ratio 5.0 








Medications





Current Medications


Ondansetron HCl (Zofran) 4 mg PRN Q8HRS  PRN IV NAUSEA/VOMITING 1ST CHOICE;  

Start 12/9/19 at 22:30;  Stop 12/10/19 at 22:29;  Status DC


Acetaminophen (Tylenol) 650 mg PRN Q4HRS  PRN PO FEVER;  Start 12/9/19 at 22:30;

 Stop 12/10/19 at 22:29;  Status DC


Sodium Chloride 1,000 ml @  75 mls/hr 1X  ONCE IV  Last administered on 

12/9/19at 23:36;  Start 12/9/19 at 23:00;  Stop 12/10/19 at 12:19;  Status DC


Donepezil HCl (Aricept) 10 mg DAILY08 PO  Last administered on 12/11/19at 08:27;

 Start 12/10/19 at 16:30


Lisinopril (Prinivil) 10 mg DAILY08 PO  Last administered on 12/11/19at 08:28;  

Start 12/10/19 at 16:30


Memantine (Namenda) 10 mg DAILY08 PO  Last administered on 12/11/19at 08:27;  

Start 12/10/19 at 16:30


Simvastatin (Zocor) 40 mg HS PO  Last administered on 12/11/19at 21:13;  Start 

12/10/19 at 21:00


Tamsulosin HCl (Flomax) 0.4 mg HS PO  Last administered on 12/11/19at 21:13;  

Start 12/10/19 at 21:00


Timolol Maleate (Timoptic 0.5% [facility]) 1 drop HS OU  Last administered on 

12/11/19at 21:13;  Start 12/10/19 at 21:00


Sodium Chloride (Normal Saline Flush) 3 ml QSHIFT  PRN IV AFTER MEDS AND BLOOD 

DRAWS;  Start 12/10/19 at 22:45


Ondansetron HCl (Zofran) 4 mg PRN Q4HRS  PRN IV NAUSEA/VOMITING 1ST CHOICE;  

Start 12/10/19 at 22:45


Acetaminophen (Tylenol) 650 mg PRN Q4HRS  PRN PO TEMP OVER 100.4F OR MILD PAIN 

Last administered on 12/11/19at 04:18;  Start 12/10/19 at 22:45


Docusate Sodium (Colace) 100 mg PRN BID  PRN PO CONSTIPATION 1ST CHOICE;  Start 

12/10/19 at 22:45


Albuterol Sulfate (Ventolin Neb Soln) 2.5 mg PRN Q4HRS  PRN NEB SHORTNESS OF 

BREATH;  Start 12/10/19 at 22:45


Guaifenesin (Robitussin) 200 mg PRN Q4HRS  PRN PO COUGH;  Start 12/10/19 at 

22:45


Lorazepam (Ativan) 0.5 mg PRN Q4HRS  PRN PO ANXIETY / AGITATION Last 

administered on 12/11/19at 21:13;  Start 12/10/19 at 22:45


Enoxaparin Sodium (Lovenox 30mg Syringe) 30 mg DAILY SQ  Last administered on 

12/11/19at 08:29;  Start 12/11/19 at 09:00


Sodium Chloride 1,000 ml @  75 mls/hr [id number] IV  Last administered on 

12/12/19at 04:00;  Start 12/11/19 at 14:15


Aspirin (Ecotrin) 81 mg DAILYWBKFT PO ;  Start 12/11/19 at 17:00


Bupivacaine HCl (Sensorcaine-Mpf 0.25%) 10 ml 1X  ONCE IJ ;  Start 12/11/19 at 

19:30;  Stop 12/11/19 at 19:31;  Status DC


Methylprednisolone Acetate (DEPO-Medrol 80MG VIAL) 160 mg 1X  ONCE IM ;  Start 

12/11/19 at 19:30;  Stop 12/11/19 at 19:31;  Status DC





Active Scripts


Active


Reported


Tamsulosin Hcl 0.4 Mg Cap.er.24h   


Namenda (Memantine Hcl) 10 Mg Tablet   


Timolol Maleate 10 Ml Drops   


Lisinopril 20 Mg Tablet   


Donepezil Hcl 10 Mg Tablet   


Simvastatin 40 Mg Tablet


Vitals/I & O





Vital Sign - Last 24 Hours








 12/11/19 12/11/19 12/11/19 12/11/19





 08:28 11:00 15:00 19:00


 


Temp  97.9 97.3 98.3





  97.9 97.3 98.3


 


Pulse 76 76 74 83


 


Resp  18 18 19


 


B/P (MAP) 170/78 144/78 (100) 128/83 (98) 151/84 (106)


 


Pulse Ox  95 96 95


 


O2 Delivery  Room Air Room Air Room Air


 


    





    





 12/11/19 12/11/19 12/12/19 12/12/19





 20:00 23:00 03:00 07:00


 


Temp  98.3 98.1 97.7





  98.3 98.1 97.7


 


Pulse  77 74 70


 


Resp  21 18 16


 


B/P (MAP)  172/78 (109) 151/88 (109) 154/83 (106)


 


Pulse Ox  97 94 96


 


O2 Delivery Room Air Room Air Room Air Room Air














Intake and Output   


 


 12/11/19 12/11/19 12/12/19





 15:00 23:00 07:00


 


Intake Total  120 ml 


 


Balance  120 ml 

















RAMON WRIGHT MD          Dec 12, 2019 08:24

## 2019-12-13 VITALS — DIASTOLIC BLOOD PRESSURE: 76 MMHG | SYSTOLIC BLOOD PRESSURE: 157 MMHG

## 2019-12-13 VITALS — DIASTOLIC BLOOD PRESSURE: 65 MMHG | SYSTOLIC BLOOD PRESSURE: 111 MMHG

## 2019-12-13 VITALS — DIASTOLIC BLOOD PRESSURE: 71 MMHG | SYSTOLIC BLOOD PRESSURE: 130 MMHG

## 2019-12-13 LAB
ANION GAP SERPL CALC-SCNC: 12 MMOL/L (ref 6–14)
BUN SERPL-MCNC: 31 MG/DL (ref 8–26)
CALCIUM SERPL-MCNC: 8.5 MG/DL (ref 8.5–10.1)
CHLORIDE SERPL-SCNC: 104 MMOL/L (ref 98–107)
CO2 SERPL-SCNC: 19 MMOL/L (ref 21–32)
CREAT SERPL-MCNC: 1.8 MG/DL (ref 0.7–1.3)
GFR SERPLBLD BASED ON 1.73 SQ M-ARVRAT: 43.3 ML/MIN
GLUCOSE SERPL-MCNC: 88 MG/DL (ref 70–99)
POTASSIUM SERPL-SCNC: 4.1 MMOL/L (ref 3.5–5.1)
SODIUM SERPL-SCNC: 135 MMOL/L (ref 136–145)

## 2019-12-13 RX ADMIN — MEMANTINE HYDROCHLORIDE SCH MG: 10 TABLET ORAL at 09:05

## 2019-12-13 RX ADMIN — ENOXAPARIN SODIUM SCH MG: 100 INJECTION SUBCUTANEOUS at 09:06

## 2019-12-13 RX ADMIN — BACITRACIN SCH MLS/HR: 5000 INJECTION, POWDER, FOR SOLUTION INTRAMUSCULAR at 06:15

## 2019-12-13 RX ADMIN — DONEPEZIL HYDROCHLORIDE SCH MG: 10 TABLET, FILM COATED ORAL at 09:05

## 2019-12-13 RX ADMIN — ASPIRIN SCH MG: 81 TABLET, COATED ORAL at 09:05

## 2019-12-13 NOTE — NUR
SW following. Chart reviewed, discussed with RN. Pt is accepted at [facility] for SNU 
today. SW awaiting discharge paperwork and any scripts for narcotics. RN notified. SW will 
continue to follow.

-------------------------------------------------------------------------------

Addendum: 12/13/19 at 1404 by NELSON BAUMANN SW

-------------------------------------------------------------------------------

SW following. Pt discharging at 1500 to [facility]. RN, pt and pt's son, Silvio 
notified. No further SW needs.

## 2019-12-13 NOTE — SNU/HH DC
DISCHARGE ORDERS


DISCHARGE INFORMATION:


FINAL DIAGNOSIS


Problems


Medical Problems:


(1) SABRA (acute kidney injury)


Status: Acute  





(2) Dehydration


Status: Acute  








CONDITION ON DISCHARGE:  Stable





CODE STATUS:


Code Status:  Full





SKILLED NURSING:


SNF STAY <30 DAYS:  Yes





HOSPICE:


HOSPICE:  No


HOSPICE EVAL & TREAT:  No





LTAC:


ADMIT TO LTAC:  No





POST DISCHARGE ORDERS:


ACTIVITY ORDERS:  Activity as tolerated


DIET AFTER DISCHARGE:  Cardiac





CHECKS AFTER DISCHARGE:


CHECKS AFTER DISCHARGE:  Check blood press - daily





TREATMENT/EQUIPMENT ORDERS:


ADAPTIVE EQUIPMENT NEEDED:  Front wheeled walker


Physical Therapy For:  Evalulation/Treatment


Occupational Therapy For:  Evaluation/Treatment


Speech Language Pathology For:  Swallow Cognition





DISCHARGE MEDICATIONS:


Home Meds


Active Scripts


Docusate Sodium (DOK) 100 Mg Capsule, 100 MG PO PRN BID PRN for CONSTIPATION 1ST

CHOICE for 30 Days, #30 CAP


   Prov:RAMON WRIGHT MD         12/13/19


Acetaminophen (TYLENOL) 325 Mg Tablet, 650 MG PO PRN Q4HRS PRN for TEMP OVER 

100.4F OR MILD PAIN for 30 Days, #60 TAB


   Prov:RAMON WRIGHT MD         12/13/19


Aspirin (ASPIRIN EC) 81 Mg Tablet.dr, 81 MG PO DAILYWBKFT for HEART HEALTH for 

30 Days, #30 TAB.SR


   Prov:RAMON WRIGHT MD         12/13/19


Albuterol Sulfate (Proair Hfa) 8.5 Gm Hfa.aer.ad, 2.5 MG NEB PRN Q4HRS PRN for 

SHORTNESS OF BREATH for 30 Days, #1 INHALER


   Prov:RAMON WRIGHT MD         12/13/19


Reported Medications


Tamsulosin Hcl (TAMSULOSIN HCL) 0.4 Mg Cap.er.24h, #180


   7/24/15


Memantine Hcl (NAMENDA) 10 Mg Tablet, #60


   7/24/15


Timolol Maleate (TIMOLOL MALEATE) 10 Ml Drops, #5


   7/24/15


Lisinopril (LISINOPRIL) 20 Mg Tablet, #45


   7/24/15


Donepezil Hcl (DONEPEZIL HCL) 10 Mg Tablet, #90


   7/24/15


Simvastatin (SIMVASTATIN) 40 Mg Tablet, #45


   7/24/15











RAMON WRIGHT MD          Dec 13, 2019 13:10

## 2019-12-13 NOTE — NUR
pt discharged to [facility] via our transportation.  accompanied by son, felicitas.  pt stable 
upon dc.  no iv in place.  report given to ESTUARDO Elise at [facility].  meds and follow up 
reviewed.

## 2019-12-13 NOTE — PDOC3
Discharge Summary


Date of Admission:  Dec 9, 2019


Date of Discharge:  Dec 13, 2019


Follow-Up:  1-2 days


Admitting Diagnosis comment:





DISCHARGE DX ==============


Assessment/Plan


 Impression:  





   SABRA (acute kidney injury)


   KNEE PAIN, INTRACTABLE 


   GAIT INSTABILITY


   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 

   narrowing of the medial compartment joint spaces.


   Dehydration


   The left ventricular systolic function is low normal.


            The Ejection Fraction is 50%.


            ECHO  Grade I-abnormal relaxation pattern. C/W DIASTOLIC CHF











ADMITTED





NEPHROLOGY CONSULT REVIEWED


ORTHO CONSULT


BEDREST PT/OT


FALL PRECAUTIONS


IV FLUID SUPPORT


NO NSAIDS


FOLLOW RENAL FX CLOSELY


pt/ot


D/C 





D/W SON IN ROOM





d/c 12/13 pp





d/c planning 33 min











Discharge Recommendations 


* Skilled Nursing Unit


Discharge Recommendation - DME 


* Rolling Walker needed


* in order to complete ADLs


* and ambulation safely





Vitals


Vitals





Vital Signs








  Date Time  Temp Pulse Resp B/P (MAP) Pulse Ox O2 Delivery O2 Flow Rate FiO2


 


12/13/19 09:06  84  157/76    


 


12/13/19 07:00 97.7  16  95 Room Air  





 97.7       











Physical Exam


General:  Alert, Cooperative, No acute distress


Heart:  Regular rate, Normal S1


Lungs:  Clear


Abdomen:  Normal bowel sounds, Soft, No tenderness


Extremities:  No cyanosis, Normal pulses, Other (He uses a walker and has a slow

cautious gait. The RIGHT knee shows a severe varus alignment.  No masses.  No 

detectable effusion.  Tenderness on the joint lines.  Range of motion is  

degrees.  There is crepitus with range of motion, and pain at the extremes of 

motion.  The knee is stable to varus and valgus stress without subluxation or 

laxity.  Muscle strength is slightly weak for the quadriceps 4+/5 which may be 

due to pain or avoidance, and does not seem neurogenic, and the muscle tone and 

bulk is slightly decreased. The hamstring strength is 5/5.   The skin is normal 

with no scars, rashes, lesions or ulcers. Light touch sensation is intact. No 

edema and no varicosities.  Dorsalis pedis pulse is intact and capillary refill 

is normal.)


Skin:  No breakdown


FINAL DIAGNOSIS


Problems


Medical Problems:


(1) SABRA (acute kidney injury)


Status: Acute  





(2) Dehydration


Status: Acute  








Brief Hospital Course


Mr. Aaron  is a 88 old [sex] who presented with [gait instability, intractable 

knee pain ]


CONDITION AT DISCHARGE:  Improved


Discharge Medications





Current Medications


Ondansetron HCl (Zofran) 4 mg PRN Q8HRS  PRN IV NAUSEA/VOMITING 1ST CHOICE;  

Start 12/9/19 at 22:30;  Stop 12/10/19 at 22:29;  Status DC


Acetaminophen (Tylenol) 650 mg PRN Q4HRS  PRN PO FEVER;  Start 12/9/19 at 22:30;

 Stop 12/10/19 at 22:29;  Status DC


Sodium Chloride 1,000 ml @  75 mls/hr 1X  ONCE IV  Last administered on 

12/9/19at 23:36;  Start 12/9/19 at 23:00;  Stop 12/10/19 at 12:19;  Status DC


Donepezil HCl (Aricept) 10 mg DAILY08 PO  Last administered on 12/13/19at 09:05;

 Start 12/10/19 at 16:30


Lisinopril (Prinivil) 10 mg DAILY08 PO  Last administered on 12/12/19at 08:34;  

Start 12/10/19 at 16:30;  Stop 12/12/19 at 16:54;  Status DC


Memantine (Namenda) 10 mg DAILY08 PO  Last administered on 12/13/19at 09:05;  

Start 12/10/19 at 16:30


Simvastatin (Zocor) 40 mg HS PO  Last administered on 12/12/19at 21:39;  Start 

12/10/19 at 21:00


Tamsulosin HCl (Flomax) 0.4 mg HS PO  Last administered on 12/12/19at 21:38;  

Start 12/10/19 at 21:00


Timolol Maleate (Timoptic 0.5% [facility]) 1 drop HS OU  Last administered on 

12/12/19at 21:38;  Start 12/10/19 at 21:00


Sodium Chloride (Normal Saline Flush) 3 ml QSHIFT  PRN IV AFTER MEDS AND BLOOD 

DRAWS;  Start 12/10/19 at 22:45


Ondansetron HCl (Zofran) 4 mg PRN Q4HRS  PRN IV NAUSEA/VOMITING 1ST CHOICE;  

Start 12/10/19 at 22:45


Acetaminophen (Tylenol) 650 mg PRN Q4HRS  PRN PO TEMP OVER 100.4F OR MILD PAIN 

Last administered on 12/11/19at 04:18;  Start 12/10/19 at 22:45


Docusate Sodium (Colace) 100 mg PRN BID  PRN PO CONSTIPATION 1ST CHOICE;  Start 

12/10/19 at 22:45


Albuterol Sulfate (Ventolin Neb Soln) 2.5 mg PRN Q4HRS  PRN NEB SHORTNESS OF 

BREATH;  Start 12/10/19 at 22:45


Guaifenesin (Robitussin) 200 mg PRN Q4HRS  PRN PO COUGH;  Start 12/10/19 at 

22:45


Lorazepam (Ativan) 0.5 mg PRN Q4HRS  PRN PO ANXIETY / AGITATION Last 

administered on 12/11/19at 21:13;  Start 12/10/19 at 22:45


Enoxaparin Sodium (Lovenox 30mg Syringe) 30 mg DAILY SQ  Last administered on 

12/13/19at 09:06;  Start 12/11/19 at 09:00


Sodium Chloride 1,000 ml @  75 mls/hr [id number] IV  Last administered on 

12/12/19at 17:14;  Start 12/11/19 at 14:15


Aspirin (Ecotrin) 81 mg DAILYWBKFT PO  Last administered on 12/13/19at 09:05;  

Start 12/11/19 at 17:00


Bupivacaine HCl (Sensorcaine-Mpf 0.25%) 10 ml 1X  ONCE IJ ;  Start 12/11/19 at 

19:30;  Stop 12/11/19 at 19:31;  Status DC


Methylprednisolone Acetate (DEPO-Medrol 80MG VIAL) 160 mg 1X  ONCE IM ;  Start 

12/11/19 at 19:30;  Stop 12/11/19 at 19:31;  Status DC


Lisinopril (Prinivil) 20 mg DAILY08 PO  Last administered on 12/13/19at 09:06;  

Start 12/13/19 at 08:00


Lisinopril (Prinivil) 10 mg 1X  ONCE PO  Last administered on 12/12/19at 17:13; 

Start 12/12/19 at 17:00;  Stop 12/12/19 at 17:01;  Status DC





Active Scripts


Active


Reported


Tamsulosin Hcl 0.4 Mg Cap.er.24h   


Namenda (Memantine Hcl) 10 Mg Tablet   


Timolol Maleate 10 Ml Drops   


Lisinopril 20 Mg Tablet   


Donepezil Hcl 10 Mg Tablet   


Simvastatin 40 Mg Tablet


Vital Signs





Vital Signs








  Date Time  Temp Pulse Resp B/P (MAP) Pulse Ox O2 Delivery O2 Flow Rate FiO2


 


12/13/19 11:00 97.9 87 16 130/71 (90) 96 Room Air  





 97.9       








Labs





Laboratory Tests








Test


 12/13/19


04:30


 


Sodium Level


 135 mmol/L


(136-145)


 


Potassium Level


 4.1 mmol/L


(3.5-5.1)


 


Chloride Level


 104 mmol/L


()


 


Carbon Dioxide Level


 19 mmol/L


(21-32)


 


Anion Gap 12 (6-14) 


 


Blood Urea Nitrogen


 31 mg/dL


(8-26)


 


Creatinine


 1.8 mg/dL


(0.7-1.3)


 


Estimated GFR


(Cockcroft-Gault) 43.3 





 


Glucose Level


 88 mg/dL


(70-99)


 


Calcium Level


 8.5 mg/dL


(8.5-10.1)








Laboratory Tests








Test


 12/13/19


04:30


 


Sodium Level


 135 mmol/L


(136-145)


 


Potassium Level


 4.1 mmol/L


(3.5-5.1)


 


Chloride Level


 104 mmol/L


()


 


Carbon Dioxide Level


 19 mmol/L


(21-32)


 


Anion Gap 12 (6-14) 


 


Blood Urea Nitrogen


 31 mg/dL


(8-26)


 


Creatinine


 1.8 mg/dL


(0.7-1.3)


 


Estimated GFR


(Cockcroft-Gault) 43.3 





 


Glucose Level


 88 mg/dL


(70-99)


 


Calcium Level


 8.5 mg/dL


(8.5-10.1)








Allergies





                                    Allergies








Coded Allergies Type Severity Reaction Last Updated Verified


 


  No Known Drug Allergies    11/7/14 No








Disposition/Orders:  Other (d/c to SNF BED)











RAMON WRIGHT MD          Dec 13, 2019 13:06

## 2019-12-13 NOTE — NUR
during BSR, pt had removed another peripheral IV sometime during night.  pt now has no IV 
access.  will pg on call to see if restart is needed.  pt possible DC today.

## 2019-12-13 NOTE — PDOC
Renal-Progress Notes


Subjective Notes


Notes


NO NEW COMPLAINTS





History of Present Illness


Hx of present illness


STABLE





Vitals


Vitals





Vital Signs








  Date Time  Temp Pulse Resp B/P (MAP) Pulse Ox O2 Delivery O2 Flow Rate FiO2


 


12/13/19 09:06  84  157/76    


 


12/13/19 07:00 97.7  16  95 Room Air  





 97.7       








Weight


Weight [ ]





I.O.


Intake and Output











Intake and Output 


 


 12/13/19





 07:00


 


Intake Total 700 ml


 


Balance 700 ml


 


 


 


Intake Oral 700 ml


 


# Voids 10


 


# Bowel Movements 2











Labs


Labs





Laboratory Tests








Test


 12/13/19


04:30


 


Sodium Level


 135 mmol/L


(136-145)


 


Potassium Level


 4.1 mmol/L


(3.5-5.1)


 


Chloride Level


 104 mmol/L


()


 


Carbon Dioxide Level


 19 mmol/L


(21-32)


 


Anion Gap 12 (6-14) 


 


Blood Urea Nitrogen


 31 mg/dL


(8-26)


 


Creatinine


 1.8 mg/dL


(0.7-1.3)


 


Estimated GFR


(Cockcroft-Gault) 43.3 





 


Glucose Level


 88 mg/dL


(70-99)


 


Calcium Level


 8.5 mg/dL


(8.5-10.1)











Review of Systems


Constitutional:  yes: other (UNABLE TO OBTAIN)





Physical Exam


General Appearance:  no apparent distress


Skin:  warm


Respiratory:  bilateral CTA


Heart:  S1S2


Abdomen:  soft, bowel sounds present


Genitourinary:  bladder flat


Neurology:  alert, follow commands





Assessment


Assessment


IMP





NICHOLAS-IMPROVED


KNEE PAIN-OA


HTN HX


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


BPH HX


DEMENTIA





PLAN





CONT FLOMAX


CONT ACE-I


WILL SIGN OFF 


PLEASE CALL IF NEEDED











FERNANDO PENG MD                 Dec 13, 2019 10:45

## 2019-12-13 NOTE — PDOC
PROGRESS NOTES


History of Present Illness


History of Present Illness





VTE Prophylaxis Ordered


VTE Prophylaxis Devices:  No


VTE Pharmacological Prophylaxi:  No





DISCHARGE DX ==============


Assessment/Plan


 Impression:  





   SABRA (acute kidney injury)


   KNEE PAIN, INTRACTABLE 


   GAIT INSTABILITY


   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 

   narrowing of the medial compartment joint spaces.


   Dehydration


   The left ventricular systolic function is low normal.


            The Ejection Fraction is 50%.


            ECHO  Grade I-abnormal relaxation pattern. C/W DIASTOLIC CHF











ADMITTED





NEPHROLOGY CONSULT REVIEWED


ORTHO CONSULT


BEDREST PT/OT


FALL PRECAUTIONS


IV FLUID SUPPORT


NO NSAIDS


FOLLOW RENAL FX CLOSELY


pt/ot


D/C 





D/W SON IN ROOM





d/c 12/13 pp











Discharge Recommendations 


* Skilled Nursing Unit


Discharge Recommendation - DME 


* Rolling Walker needed


* in order to complete ADLs


* and ambulation safely





Vitals


Vitals





Vital Signs








  Date Time  Temp Pulse Resp B/P (MAP) Pulse Ox O2 Delivery O2 Flow Rate FiO2


 


12/13/19 09:06  84  157/76    


 


12/13/19 07:00 97.7  16  95 Room Air  





 97.7       











Physical Exam


General:  Alert, Cooperative, No acute distress


Heart:  Regular rate, Normal S1


Lungs:  Clear


Abdomen:  Normal bowel sounds, Soft, No tenderness


Extremities:  No cyanosis, Normal pulses, Other (He uses a walker and has a slow

cautious gait. The RIGHT knee shows a severe varus alignment.  No masses.  No 

detectable effusion.  Tenderness on the joint lines.  Range of motion is  

degrees.  There is crepitus with range of motion, and pain at the extremes of 

motion.  The knee is stable to varus and valgus stress without subluxation or 

laxity.  Muscle strength is slightly weak for the quadriceps 4+/5 which may be 

due to pain or avoidance, and does not seem neurogenic, and the muscle tone and 

bulk is slightly decreased. The hamstring strength is 5/5.   The skin is normal 

with no scars, rashes, lesions or ulcers. Light touch sensation is intact. No 

edema and no varicosities.  Dorsalis pedis pulse is intact and capillary refill 

is normal.)


Skin:  No breakdown





Labs


LABS





Laboratory Tests








Test


 12/13/19


04:30


 


Sodium Level


 135 mmol/L


(136-145)


 


Potassium Level


 4.1 mmol/L


(3.5-5.1)


 


Chloride Level


 104 mmol/L


()


 


Carbon Dioxide Level


 19 mmol/L


(21-32)


 


Anion Gap 12 (6-14) 


 


Blood Urea Nitrogen


 31 mg/dL


(8-26)


 


Creatinine


 1.8 mg/dL


(0.7-1.3)


 


Estimated GFR


(Cockcroft-Gault) 43.3 





 


Glucose Level


 88 mg/dL


(70-99)


 


Calcium Level


 8.5 mg/dL


(8.5-10.1)











Assessment and Plan


Assessmemt and Plan


Problems


Medical Problems:


(1) SABRA (acute kidney injury)


Status: Acute  





(2) Dehydration


Status: Acute  











Comment


Review of Relevant


I have reviewed the following items joey (where applicable) has been applied.


Labs





Laboratory Tests








Test


 12/13/19


04:30


 


Sodium Level


 135 mmol/L


(136-145)


 


Potassium Level


 4.1 mmol/L


(3.5-5.1)


 


Chloride Level


 104 mmol/L


()


 


Carbon Dioxide Level


 19 mmol/L


(21-32)


 


Anion Gap 12 (6-14) 


 


Blood Urea Nitrogen


 31 mg/dL


(8-26)


 


Creatinine


 1.8 mg/dL


(0.7-1.3)


 


Estimated GFR


(Cockcroft-Gault) 43.3 





 


Glucose Level


 88 mg/dL


(70-99)


 


Calcium Level


 8.5 mg/dL


(8.5-10.1)








Laboratory Tests








Test


 12/13/19


04:30


 


Sodium Level


 135 mmol/L


(136-145)


 


Potassium Level


 4.1 mmol/L


(3.5-5.1)


 


Chloride Level


 104 mmol/L


()


 


Carbon Dioxide Level


 19 mmol/L


(21-32)


 


Anion Gap 12 (6-14) 


 


Blood Urea Nitrogen


 31 mg/dL


(8-26)


 


Creatinine


 1.8 mg/dL


(0.7-1.3)


 


Estimated GFR


(Cockcroft-Gault) 43.3 





 


Glucose Level


 88 mg/dL


(70-99)


 


Calcium Level


 8.5 mg/dL


(8.5-10.1)








Medications





Current Medications


Ondansetron HCl (Zofran) 4 mg PRN Q8HRS  PRN IV NAUSEA/VOMITING 1ST CHOICE;  

Start 12/9/19 at 22:30;  Stop 12/10/19 at 22:29;  Status DC


Acetaminophen (Tylenol) 650 mg PRN Q4HRS  PRN PO FEVER;  Start 12/9/19 at 22:30;

 Stop 12/10/19 at 22:29;  Status DC


Sodium Chloride 1,000 ml @  75 mls/hr 1X  ONCE IV  Last administered on 

12/9/19at 23:36;  Start 12/9/19 at 23:00;  Stop 12/10/19 at 12:19;  Status DC


Donepezil HCl (Aricept) 10 mg DAILY08 PO  Last administered on 12/13/19at 09:05;

 Start 12/10/19 at 16:30


Lisinopril (Prinivil) 10 mg DAILY08 PO  Last administered on 12/12/19at 08:34;  

Start 12/10/19 at 16:30;  Stop 12/12/19 at 16:54;  Status DC


Memantine (Namenda) 10 mg DAILY08 PO  Last administered on 12/13/19at 09:05;  

Start 12/10/19 at 16:30


Simvastatin (Zocor) 40 mg HS PO  Last administered on 12/12/19at 21:39;  Start 

12/10/19 at 21:00


Tamsulosin HCl (Flomax) 0.4 mg HS PO  Last administered on 12/12/19at 21:38;  

Start 12/10/19 at 21:00


Timolol Maleate (Timoptic 0.5% [facility]) 1 drop HS OU  Last administered on 

12/12/19at 21:38;  Start 12/10/19 at 21:00


Sodium Chloride (Normal Saline Flush) 3 ml QSHIFT  PRN IV AFTER MEDS AND BLOOD 

DRAWS;  Start 12/10/19 at 22:45


Ondansetron HCl (Zofran) 4 mg PRN Q4HRS  PRN IV NAUSEA/VOMITING 1ST CHOICE;  

Start 12/10/19 at 22:45


Acetaminophen (Tylenol) 650 mg PRN Q4HRS  PRN PO TEMP OVER 100.4F OR MILD PAIN 

Last administered on 12/11/19at 04:18;  Start 12/10/19 at 22:45


Docusate Sodium (Colace) 100 mg PRN BID  PRN PO CONSTIPATION 1ST CHOICE;  Start 

12/10/19 at 22:45


Albuterol Sulfate (Ventolin Neb Soln) 2.5 mg PRN Q4HRS  PRN NEB SHORTNESS OF 

BREATH;  Start 12/10/19 at 22:45


Guaifenesin (Robitussin) 200 mg PRN Q4HRS  PRN PO COUGH;  Start 12/10/19 at 

22:45


Lorazepam (Ativan) 0.5 mg PRN Q4HRS  PRN PO ANXIETY / AGITATION Last 

administered on 12/11/19at 21:13;  Start 12/10/19 at 22:45


Enoxaparin Sodium (Lovenox 30mg Syringe) 30 mg DAILY SQ  Last administered on 

12/13/19at 09:06;  Start 12/11/19 at 09:00


Sodium Chloride 1,000 ml @  75 mls/hr [id number] IV  Last administered on 

12/12/19at 17:14;  Start 12/11/19 at 14:15


Aspirin (Ecotrin) 81 mg DAILYWBKFT PO  Last administered on 12/13/19at 09:05;  

Start 12/11/19 at 17:00


Bupivacaine HCl (Sensorcaine-Mpf 0.25%) 10 ml 1X  ONCE IJ ;  Start 12/11/19 at 

19:30;  Stop 12/11/19 at 19:31;  Status DC


Methylprednisolone Acetate (DEPO-Medrol 80MG VIAL) 160 mg 1X  ONCE IM ;  Start 

12/11/19 at 19:30;  Stop 12/11/19 at 19:31;  Status DC


Lisinopril (Prinivil) 20 mg DAILY08 PO  Last administered on 12/13/19at 09:06;  

Start 12/13/19 at 08:00


Lisinopril (Prinivil) 10 mg 1X  ONCE PO  Last administered on 12/12/19at 17:13; 

Start 12/12/19 at 17:00;  Stop 12/12/19 at 17:01;  Status DC





Active Scripts


Active


Reported


Tamsulosin Hcl 0.4 Mg Cap.er.24h   


Namenda (Memantine Hcl) 10 Mg Tablet   


Timolol Maleate 10 Ml Drops   


Lisinopril 20 Mg Tablet   


Donepezil Hcl 10 Mg Tablet   


Simvastatin 40 Mg Tablet


Vitals/I & O





Vital Sign - Last 24 Hours








 12/12/19 12/12/19 12/12/19 12/12/19





 15:08 17:13 19:00 20:20


 


Temp 97.7  98.1 





 97.7  98.1 


 


Pulse 81 81 83 


 


Resp 18  20 


 


B/P (MAP) 145/95 (112) 145/95 124/58 (80) 


 


Pulse Ox 97  96 


 


O2 Delivery Room Air  Room Air Room Air


 


    





    





 12/12/19 12/13/19 12/13/19 12/13/19





 23:00 03:00 07:00 09:06


 


Temp 98.4 98.0 97.7 





 98.4 98.0 97.7 


 


Pulse 75 82 84 84


 


Resp 20 20 16 


 


B/P (MAP) 126/84 (98) 111/65 (80) 157/76 (103) 157/76


 


Pulse Ox 95 97 95 


 


O2 Delivery Room Air Room Air Room Air 














Intake and Output   


 


 12/12/19 12/12/19 12/13/19





 15:00 23:00 07:00


 


Intake Total  200 ml 500 ml


 


Balance  200 ml 500 ml

















RAMON WRIGHT MD          Dec 13, 2019 11:16